# Patient Record
Sex: FEMALE | Race: WHITE | NOT HISPANIC OR LATINO | Employment: FULL TIME | URBAN - METROPOLITAN AREA
[De-identification: names, ages, dates, MRNs, and addresses within clinical notes are randomized per-mention and may not be internally consistent; named-entity substitution may affect disease eponyms.]

---

## 2017-09-15 ENCOUNTER — TRANSCRIBE ORDERS (OUTPATIENT)
Dept: URGENT CARE | Facility: CLINIC | Age: 62
End: 2017-09-15

## 2017-09-15 ENCOUNTER — APPOINTMENT (OUTPATIENT)
Dept: LAB | Facility: CLINIC | Age: 62
End: 2017-09-15
Payer: COMMERCIAL

## 2017-09-15 DIAGNOSIS — E55.9 VITAMIN D DEFICIENCY: ICD-10-CM

## 2017-09-15 DIAGNOSIS — R53.83 FATIGUE, UNSPECIFIED TYPE: Primary | ICD-10-CM

## 2017-09-15 LAB
25(OH)D3 SERPL-MCNC: 26.6 NG/ML (ref 30–100)
ALBUMIN SERPL BCP-MCNC: 3.6 G/DL (ref 3.5–5)
ALP SERPL-CCNC: 94 U/L (ref 46–116)
ALT SERPL W P-5'-P-CCNC: 25 U/L (ref 12–78)
ANION GAP SERPL CALCULATED.3IONS-SCNC: 6 MMOL/L (ref 4–13)
AST SERPL W P-5'-P-CCNC: 20 U/L (ref 5–45)
BILIRUB SERPL-MCNC: 0.54 MG/DL (ref 0.2–1)
BUN SERPL-MCNC: 11 MG/DL (ref 5–25)
CALCIUM SERPL-MCNC: 8.8 MG/DL (ref 8.3–10.1)
CHLORIDE SERPL-SCNC: 106 MMOL/L (ref 100–108)
CHOLEST SERPL-MCNC: 170 MG/DL (ref 50–200)
CO2 SERPL-SCNC: 28 MMOL/L (ref 21–32)
CREAT SERPL-MCNC: 0.74 MG/DL (ref 0.6–1.3)
GFR SERPL CREATININE-BSD FRML MDRD: 88 ML/MIN/1.73SQ M
GLUCOSE P FAST SERPL-MCNC: 96 MG/DL (ref 65–99)
HDLC SERPL-MCNC: 61 MG/DL (ref 40–60)
LDLC SERPL CALC-MCNC: 91 MG/DL (ref 0–100)
POTASSIUM SERPL-SCNC: 4 MMOL/L (ref 3.5–5.3)
PROT SERPL-MCNC: 6.9 G/DL (ref 6.4–8.2)
SODIUM SERPL-SCNC: 140 MMOL/L (ref 136–145)
TRIGL SERPL-MCNC: 89 MG/DL
TSH SERPL DL<=0.05 MIU/L-ACNC: 1.97 UIU/ML (ref 0.36–3.74)

## 2017-09-15 PROCEDURE — 80053 COMPREHEN METABOLIC PANEL: CPT

## 2017-09-15 PROCEDURE — 84443 ASSAY THYROID STIM HORMONE: CPT

## 2017-09-15 PROCEDURE — 82306 VITAMIN D 25 HYDROXY: CPT

## 2017-09-15 PROCEDURE — 80061 LIPID PANEL: CPT

## 2017-09-15 PROCEDURE — 86803 HEPATITIS C AB TEST: CPT

## 2017-09-15 PROCEDURE — 36415 COLL VENOUS BLD VENIPUNCTURE: CPT

## 2017-09-16 LAB — HCV AB SER QL: NORMAL

## 2018-01-10 ENCOUNTER — OFFICE VISIT (OUTPATIENT)
Dept: URGENT CARE | Facility: CLINIC | Age: 63
End: 2018-01-10
Payer: COMMERCIAL

## 2018-01-10 PROCEDURE — 99213 OFFICE O/P EST LOW 20 MIN: CPT

## 2018-01-16 NOTE — PROGRESS NOTES
Assessment   1  Bilateral otitis media (382 9) (H66 93)   2  Acute frontal sinusitis (461 1) (J01 10)    Plan   Acute frontal sinusitis    · Amoxicillin-Pot Clavulanate 875-125 MG Oral Tablet; TAKE 1 TABLET EVERY 12    HOURS DAILY    Discussion/Summary   Discussion Summary:    Discussed diagnosis of bilateral otitis media and sinusitis we will treat with Augmentin p  o  b i d  for 10 days and follow up with PCP in 3-5 days  Medication Side Effects Reviewed: Possible side effects of new medications were reviewed with the patient/guardian today  Understands and agrees with treatment plan: The treatment plan was reviewed with the patient/guardian  The patient/guardian understands and agrees with the treatment plan    Counseling Documentation With Imm: The patient was counseled regarding instructions for management,-- patient and family education,-- importance of compliance with treatment  total time of encounter was 25 minutes-- and-- 10 minutes was spent counseling  Follow Up Instructions: Follow Up with your Primary Care Provider in 3-5 days  If your symptoms worsen, go to the nearest [facility] Emergency Department  Chief Complaint   1  Dizziness  Chief Complaint Free Text Note Form: woke up this a m  with dizziness, room spinning and vomited ; off balance walking back to exam room  Did have cold sx prior to this dizziness  History of Present Illness   HPI: 79-year-old female at urgent care with chief complaint of dizziness felt like the room was spinning and vomited once earlier today no further complaints of nausea at this time also having complaints of ear fullness and pressure    Hospital Based Practices Required Assessment:      Pain Assessment      the patient states they do not have pain  Abuse And Domestic Violence Screen       Yes, the patient is safe at home  -- The patient states no one is hurting them  Depression And Suicide Screen   No, the patient has not had thoughts of hurting themself  Readiness To Learn: Receptive  Barriers To Learning: none  Preferred Learning: verbal      Education Completed: disease/condition,-- medications-- and-- further treatment/follow-up      Teaching Method: verbal      Person Taught: patient      Evaluation Of Learning: verbalized/demonstrated understanding    Dizziness: Hudson Kaur presents with complaints of dizziness  Associated symptoms include nausea,-- vomiting,-- symptoms of an upper respiratory infection,-- ear pain-- and-- ear fullness, but-- no weakness,-- no syncope,-- no difficulty ambulating,-- no diarrhea,-- no headache,-- no fever,-- no tinnitus,-- no hearing loss,-- no neck pain,-- no neck stiffness,-- no visual changes,-- no dysarthria,-- no dysphagia,-- no facial weakness-- and-- no falling episodes  Review of Systems   Focused-Female:      Constitutional: No fever, no chills, feels well, no tiredness, no recent weight gain or loss  ENT: earache-- and-- nasal discharge, but-- as noted in HPI  Cardiovascular: no complaints of slow or fast heart rate, no chest pain, no palpitations, no leg claudication or lower extremity edema  Respiratory: no complaints of shortness of breath, no wheezing, no dyspnea on exertion, no orthopnea or PND  Breasts: no complaints of breast pain, breast lump or nipple discharge  Gastrointestinal: no complaints of abdominal pain, no constipation, no nausea or diarrhea, no vomiting, no bloody stools  Genitourinary: no complaints of dysuria, no incontinence, no pelvic pain, no dysmenorrhea, no vaginal discharge or abnormal vaginal bleeding  Musculoskeletal: no complaints of arthralgia, no myalgia, no joint swelling or stiffness, no limb pain or swelling  Integumentary: no complaints of skin rash or lesion, no itching or dry skin, no skin wounds  Neurological: dizziness, but-- as noted in HPI  ROS Reviewed:    ROS reviewed        Active Problems   1  Abnormal heart sounds (785 3) (R01 2)   2  Abscess of finger, right (681 00) (L02 511)   3  Laryngitis (464 00) (J04 0)    Past Medical History   1  History of Allergic Contact Dermatitis (692 9)   2  History of Denial Of Any Significant Medical History  Active Problems And Past Medical History Reviewed: The active problems and past medical history were reviewed and updated today  Family History   Family History    1  Family history of Hypertension (V17 49)  Family History Reviewed: The family history was reviewed and updated today  Social History    · Denied: History of Drug use   · Never A Smoker   · Rarely consumes alcohol (V49 89) (Z78 9)  Social History Reviewed: The social history was reviewed and updated today  The social history was reviewed and is unchanged  Surgical History   1  History of Excision Of Lesion Neck Benign   2  History of Surgery Excision Of Parotid Tumor/Gland   3  History of Tonsillectomy With Adenoidectomy   4  History of Tubal Ligation  Surgical History Reviewed: The surgical history was reviewed and updated today  Current Meds    1  Amoxicillin 500 MG Oral Tablet; TAKE 1 TABLET 3 times daily; Therapy: 21Aug2015 to (Evaluate:28Aug2015)  Requested for: 21Aug2015; Last     Rx:21Aug2015 Ordered   2  Calcium 600 MG Oral Tablet; Therapy: (Recorded:25Apr2014) to Recorded   3  Dayquil LIQD;     Therapy: (Recorded:10Jan2018) to Recorded   4  NyQuil LIQD;     Therapy: (Recorded:10Jan2018) to Recorded   5  PredniSONE 20 MG Oral Tablet; TAKE 2 TABLETS IN THE MORNING FOR THE FIRST 3     DAYS, THEN TAKE 1 TABLET IN THE MORNING FOR THE NEXT 3 DAYS; Therapy: 21Aug2015 to (Last Rx:21Aug2015)  Requested for: 21Aug2015 Ordered   6  Vitamin C CAPS; Therapy: (Kervin Deutsch) to Recorded   7  Vitamin D3 CAPS; Therapy: (Recorded:25Apr2014) to Recorded  Medication List Reviewed: The medication list was reviewed and updated today  Allergies   1  No Known Drug Allergies    Vitals   Signs   Recorded: 37SYH8823 12:35PM   Temperature: 99 F  Heart Rate: 48  Respiration: 18  Systolic: 352  Diastolic: 80  Weight: 550 lb   BMI Calculated: 23 32  BSA Calculated: 1 68  O2 Saturation: 96  Pain Scale: 0    Physical Exam        Constitutional      General appearance: No acute distress, well appearing and well nourished  Eyes      Conjunctiva and lids: No swelling, erythema or discharge  Pupils and irises: Equal, round and reactive to light  Ears, Nose, Mouth, and Throat      External inspection of ears and nose: Normal        Otoscopic examination: Abnormal   The right tympanic membrane was red-- and-- was bulging  The left tympanic membrane was red-- and-- was bulging  The right external canal was normal  The left external canal was normal       Nasal mucosa, septum, and turbinates: Abnormal   normal nasal septum,-- no intranasal masses or polyps-- and-- normal nasal turbinates  There was a purulent discharge from both nares  The bilateral nasal mucosa was boggy  Oropharynx: Normal with no erythema, edema, exudate or lesions  Pulmonary      Respiratory effort: No increased work of breathing or signs of respiratory distress  Auscultation of lungs: Clear to auscultation  Cardiovascular      Palpation of heart: Normal PMI, no thrills  Auscultation of heart: Normal rate and rhythm, normal S1 and S2, without murmurs  Lymphatic      Palpation of lymph nodes in neck: No lymphadenopathy         Musculoskeletal      Gait and station: Normal        Psychiatric      Orientation to person, place, and time: Normal        Mood and affect: Normal        Signatures    Electronically signed by : Arti Ramos NP; Jorge 10 2018 12:54PM EST                       (Author)     Electronically signed by : MARY Banegas ; Jorge 15 2018 11:08AM EST                       (Co-author)

## 2018-01-23 VITALS
DIASTOLIC BLOOD PRESSURE: 80 MMHG | WEIGHT: 138 LBS | BODY MASS INDEX: 23.32 KG/M2 | OXYGEN SATURATION: 96 % | TEMPERATURE: 99 F | SYSTOLIC BLOOD PRESSURE: 142 MMHG | RESPIRATION RATE: 18 BRPM | HEART RATE: 48 BPM

## 2020-08-20 ENCOUNTER — TRANSCRIBE ORDERS (OUTPATIENT)
Dept: LAB | Facility: MEDICAL CENTER | Age: 65
End: 2020-08-20

## 2020-08-20 ENCOUNTER — APPOINTMENT (OUTPATIENT)
Dept: LAB | Facility: MEDICAL CENTER | Age: 65
End: 2020-08-20
Payer: COMMERCIAL

## 2020-08-20 DIAGNOSIS — I10 HYPERTENSION, UNSPECIFIED TYPE: Primary | ICD-10-CM

## 2020-08-20 DIAGNOSIS — M25.511 RIGHT SHOULDER PAIN, UNSPECIFIED CHRONICITY: ICD-10-CM

## 2020-08-20 DIAGNOSIS — R53.83 FATIGUE, UNSPECIFIED TYPE: ICD-10-CM

## 2020-08-20 DIAGNOSIS — I10 HYPERTENSION, UNSPECIFIED TYPE: ICD-10-CM

## 2020-08-20 DIAGNOSIS — M25.50 ARTHRALGIA, UNSPECIFIED JOINT: ICD-10-CM

## 2020-08-20 DIAGNOSIS — E55.9 VITAMIN D DEFICIENCY: ICD-10-CM

## 2020-08-20 DIAGNOSIS — F41.9 ANXIETY: ICD-10-CM

## 2020-08-20 DIAGNOSIS — R00.2 PALPITATIONS: ICD-10-CM

## 2020-08-20 LAB
25(OH)D3 SERPL-MCNC: 41.5 NG/ML (ref 30–100)
ALBUMIN SERPL BCP-MCNC: 4.1 G/DL (ref 3.5–5)
ALP SERPL-CCNC: 101 U/L (ref 46–116)
ALT SERPL W P-5'-P-CCNC: 22 U/L (ref 12–78)
ANION GAP SERPL CALCULATED.3IONS-SCNC: 4 MMOL/L (ref 4–13)
AST SERPL W P-5'-P-CCNC: 14 U/L (ref 5–45)
BASOPHILS # BLD AUTO: 0.05 THOUSANDS/ΜL (ref 0–0.1)
BASOPHILS NFR BLD AUTO: 1 % (ref 0–1)
BILIRUB SERPL-MCNC: 0.49 MG/DL (ref 0.2–1)
BUN SERPL-MCNC: 12 MG/DL (ref 5–25)
CALCIUM SERPL-MCNC: 9.1 MG/DL (ref 8.3–10.1)
CHLORIDE SERPL-SCNC: 107 MMOL/L (ref 100–108)
CO2 SERPL-SCNC: 31 MMOL/L (ref 21–32)
CREAT SERPL-MCNC: 0.65 MG/DL (ref 0.6–1.3)
EOSINOPHIL # BLD AUTO: 0.11 THOUSAND/ΜL (ref 0–0.61)
EOSINOPHIL NFR BLD AUTO: 2 % (ref 0–6)
ERYTHROCYTE [DISTWIDTH] IN BLOOD BY AUTOMATED COUNT: 13 % (ref 11.6–15.1)
GFR SERPL CREATININE-BSD FRML MDRD: 95 ML/MIN/1.73SQ M
GLUCOSE P FAST SERPL-MCNC: 83 MG/DL (ref 65–99)
HCT VFR BLD AUTO: 42.6 % (ref 34.8–46.1)
HGB BLD-MCNC: 13.8 G/DL (ref 11.5–15.4)
IMM GRANULOCYTES # BLD AUTO: 0.02 THOUSAND/UL (ref 0–0.2)
IMM GRANULOCYTES NFR BLD AUTO: 0 % (ref 0–2)
LYMPHOCYTES # BLD AUTO: 1.41 THOUSANDS/ΜL (ref 0.6–4.47)
LYMPHOCYTES NFR BLD AUTO: 29 % (ref 14–44)
MCH RBC QN AUTO: 29.9 PG (ref 26.8–34.3)
MCHC RBC AUTO-ENTMCNC: 32.4 G/DL (ref 31.4–37.4)
MCV RBC AUTO: 92 FL (ref 82–98)
MONOCYTES # BLD AUTO: 0.38 THOUSAND/ΜL (ref 0.17–1.22)
MONOCYTES NFR BLD AUTO: 8 % (ref 4–12)
NEUTROPHILS # BLD AUTO: 2.89 THOUSANDS/ΜL (ref 1.85–7.62)
NEUTS SEG NFR BLD AUTO: 60 % (ref 43–75)
NRBC BLD AUTO-RTO: 0 /100 WBCS
PLATELET # BLD AUTO: 236 THOUSANDS/UL (ref 149–390)
PMV BLD AUTO: 11.3 FL (ref 8.9–12.7)
POTASSIUM SERPL-SCNC: 3.7 MMOL/L (ref 3.5–5.3)
PROT SERPL-MCNC: 7.6 G/DL (ref 6.4–8.2)
RBC # BLD AUTO: 4.61 MILLION/UL (ref 3.81–5.12)
SODIUM SERPL-SCNC: 142 MMOL/L (ref 136–145)
T4 FREE SERPL-MCNC: 0.89 NG/DL (ref 0.76–1.46)
TSH SERPL DL<=0.05 MIU/L-ACNC: 1.42 UIU/ML (ref 0.36–3.74)
WBC # BLD AUTO: 4.86 THOUSAND/UL (ref 4.31–10.16)

## 2020-08-20 PROCEDURE — 86618 LYME DISEASE ANTIBODY: CPT

## 2020-08-20 PROCEDURE — 36415 COLL VENOUS BLD VENIPUNCTURE: CPT

## 2020-08-20 PROCEDURE — 84439 ASSAY OF FREE THYROXINE: CPT

## 2020-08-20 PROCEDURE — 82306 VITAMIN D 25 HYDROXY: CPT

## 2020-08-20 PROCEDURE — 80053 COMPREHEN METABOLIC PANEL: CPT

## 2020-08-20 PROCEDURE — 84443 ASSAY THYROID STIM HORMONE: CPT

## 2020-08-20 PROCEDURE — 85025 COMPLETE CBC W/AUTO DIFF WBC: CPT

## 2020-08-21 LAB — B BURGDOR IGG+IGM SER-ACNC: <0.91 ISR (ref 0–0.9)

## 2021-07-13 ENCOUNTER — EVALUATION (OUTPATIENT)
Dept: PHYSICAL THERAPY | Facility: CLINIC | Age: 66
End: 2021-07-13
Payer: COMMERCIAL

## 2021-07-13 VITALS — HEART RATE: 71 BPM | DIASTOLIC BLOOD PRESSURE: 87 MMHG | SYSTOLIC BLOOD PRESSURE: 128 MMHG

## 2021-07-13 DIAGNOSIS — M25.511 CHRONIC RIGHT SHOULDER PAIN: Primary | ICD-10-CM

## 2021-07-13 DIAGNOSIS — M75.01 ADHESIVE CAPSULITIS OF RIGHT SHOULDER: ICD-10-CM

## 2021-07-13 DIAGNOSIS — G89.29 CHRONIC RIGHT SHOULDER PAIN: Primary | ICD-10-CM

## 2021-07-13 PROCEDURE — 97140 MANUAL THERAPY 1/> REGIONS: CPT | Performed by: PHYSICAL THERAPIST

## 2021-07-13 PROCEDURE — 97110 THERAPEUTIC EXERCISES: CPT | Performed by: PHYSICAL THERAPIST

## 2021-07-13 PROCEDURE — 97161 PT EVAL LOW COMPLEX 20 MIN: CPT | Performed by: PHYSICAL THERAPIST

## 2021-07-13 NOTE — PROGRESS NOTES
PT Evaluation     Today's date: 2021  Patient name: Natalie Jacobo  : [birth year]  MRN: [id number]  Referring provider: Deng Nina MD  Dx:   Encounter Diagnosis     ICD-10-CM    1  Chronic right shoulder pain  M25 511     G89 29    2  Adhesive capsulitis of right shoulder  M75 01                   Assessment  Assessment details: Natalie Jacobo is a 61 y o  female who presents with complaints of  Chronic R shoulder pain  No further referral appears necessary at this time based upon examination results  Patient presents with severe limitations of R shoulder in all directions, primarily abduction > ER>IR>flexion  Clinical exam is consistent with adhesive capsulitis of R shoulder  Prognosis is good given HEP compliance and PT 2x/wk  Patient was provided today with a written HEP  Please contact me if you have any questions or recommendations  Thank you for the opportunity to share in  Janelle's care       Impairments: abnormal or restricted ROM, activity intolerance, impaired physical strength, lacks appropriate home exercise program, pain with function and poor posture   Functional limitations: severely limited functional ROM with dominant UEUnderstanding of Dx/Px/POC: good   Prognosis: good    Goals  STGs  1) In 4 weeks patient will report reduced pain to 4/10 "at worst"  2) In 4 weeks patient will demonstrate improved R shoulder ROM 15 deg or more in all directions  3) In 4 weeks patient will report improved sleep    LTGs  1) In 8-12 weeks patient will be independent with extensive HEP  2) In 8-12 weeks patient will demonstrate R shoulder ROM WNL all directions  3) In 8-12 weeks patient will report minimal R shoulder pain, <3/10 "at worst"    Plan  Patient would benefit from: skilled physical therapy  Referral necessary: No  Planned modality interventions: cryotherapy, TENS, thermotherapy: hydrocollator packs, unattended electrical stimulation and ultrasound  Planned therapy interventions: joint mobilization, manual therapy, massage, Velez taping, neuromuscular re-education, patient education, postural training, self care, strengthening, stretching, therapeutic activities, therapeutic exercise, flexibility, functional ROM exercises and home exercise program  Frequency: 2x week  Duration in weeks: 12  Plan of Care beginning date: 2021  Plan of Care expiration date: 10/5/2021  Treatment plan discussed with: patient        Subjective Evaluation    History of Present Illness  Mechanism of injury: Patient is a CNA at [employer]  She started noticing R shoulder pain around the start of COVID in the spring of 2020  She had then switched her role at work to working more on recreational activities but this did not help to relieve pain  Around Oct she noticed the pain cont to worsen  She then transitioned She saw her PCP in Oct and was prescribed Meloxicam and Voltaren  She did not notice much of an improvement  Since then the pain is about the same  She is back to her usual job since Oct which requires a lot of heavy lifting  She does experience pain that radiates down her arm to her elbow  A few times she might have noticed pain into her hand  Sometimes notices a tingling feeling in her elbow  The pain wakes her up every night  The pain is constant but fluctuates in intensity  Rest has not helped  She has tried heat and this helps her fall asleep  Has not tried using ice  OTC medication does not seem to help relieve the pain  She had an x-ray but no other imaging at this time  She has a f/u scheduled with her referring MD (PCP) at the end of July     Pain  At best pain rating: 3  At worst pain ratin  Quality: dull ache and burning  Relieving factors: change in position, support and heat  Aggravating factors: lifting    Social Support    Employment status: working  Hand dominance: right  Exercise history: walks      Diagnostic Tests  X-ray: normal  Treatments  Previous treatment: medication  Patient Goals  Patient goals for therapy: decreased pain and increased motion          Objective     Concurrent Complaints  Positive for disturbed sleep  Negative for dizziness, faints, headaches, nausea/motion sickness, tinnitus, trouble swallowing, difficulty breathing and shortness of breath    Additional Special Questions  Denies unexplained weight loss    Neurological Testing     Sensation   Cervical/Thoracic     Right   Intact: light touch    Reflexes     Right   Biceps (C5/C6): normal (2+)  Brachioradialis (C6): normal (2+)  Triceps (C7): normal (2+)    Active Range of Motion   Cervical/Thoracic Spine       Cervical    Flexion:  WFL  Extension:  WFL  Left lateral flexion:  with pain Restriction level: minimal  Right lateral flexion:  WFL  Left rotation:  with pain Restriction level: minimal  Right rotation:  WFL    Right Shoulder   Flexion: 80 degrees with pain  Extension: 25 degrees with pain  Abduction: 40 degrees with pain  External rotation BTH: C2 (tilts head to R) with pain  Internal rotation BTB: sacrum     Passive Range of Motion     Right Shoulder   Flexion: 50 degrees with pain  Abduction: 35 degrees with pain  External rotation 0°: 20 degrees with pain  Internal rotation 0°: 30 degrees with pain    Joint Play     Right Shoulder  Hypomobile in the anterior capsule, posterior capsule and inferior capsule     Neuro Exam:     Headaches   Patient reports headaches: No               Precautions: none  POC exp 10/5/21  Manuals 7/13            R shoulder stretching KG            R shoulder JMs KG  Post/inf capsule                                      Neuro Re-Ed                                                                                                        Ther Ex             Pulleys             Finger ladder             AAROM cane flexion supine 5" x10            AAROM cane ER supine 5" x10            AAROM cane abduction standing 5" x10            Doorway pec stretch Arms low            Thoracic extension over chair                                                                                                        Modalities             MHP to R shoulder prior to manuals NV                             Patient was provided a home exercise program and demonstrated an understanding of exercises  Patient was advised to stop performing home exercise program if symptoms increase or new complaints developed  Verbal understanding demonstrated regarding home exercise program instructions  Access Code: [email or web address]  URL: [email or web address]  Date: 07/13/2021  Prepared by: Maciej Thomas    Exercises  Supine Shoulder Flexion Overhead with Dowel - 3 x daily - 7 x weekly - 1 sets - 10 reps - 5 sec hold  Supine Shoulder External Rotation with Dowel - 3 x daily - 7 x weekly - 1 sets - 10 reps - 5 sec hold  Shoulder Flexion Overhead with Dowel - 3 x daily - 7 x weekly - 1 sets - 10 reps - 5 sec hold  Standing Shoulder External Rotation AAROM with Dowel - 3 x daily - 7 x weekly - 1 sets - 10 reps - 5 sec hold  Standing Shoulder Abduction AAROM with Dowel - 1 x daily - 7 x weekly - 3 sets - 10 reps

## 2021-07-13 NOTE — LETTER
2021    [place] [address]    Patient: Jany Burnett   YOB: 1955   Date of Visit: 2021     Encounter Diagnosis     ICD-10-CM    1  Chronic right shoulder pain  M25 511     G89 29    2  Adhesive capsulitis of right shoulder  M75 01        Dear Dr Fatemeh Vargas: Thank you for your recent referral of Jany Burnett  Please review the attached evaluation summary from Janelle's recent visit  Please verify that you agree with the plan of care by signing the attached order  If you have any questions or concerns, please do not hesitate to call  I sincerely appreciate the opportunity to share in the care of one of your patients and hope to have another opportunity to work with you in the near future  Sincerely,    Ayana Mahoney, PT      Referring Provider:      I certify that I have read the below Plan of Care and certify the need for these services furnished under this plan of treatment while under my care  Lore Douglas MD  [address] [address]  Via Fax: [phone]          PT Evaluation     Today's date: 2021  Patient name: Jany Burnett  : [birth year]  MRN: [id number]  Referring provider: Juju Sosa MD  Dx:   Encounter Diagnosis     ICD-10-CM    1  Chronic right shoulder pain  M25 511     G89 29    2  Adhesive capsulitis of right shoulder  M75 01                   Assessment  Assessment details: Jany Burnett is a 61 y o  female who presents with complaints of  Chronic R shoulder pain  No further referral appears necessary at this time based upon examination results  Patient presents with severe limitations of R shoulder in all directions, primarily abduction > ER>IR>flexion  Clinical exam is consistent with adhesive capsulitis of R shoulder  Prognosis is good given HEP compliance and PT 2x/wk  Patient was provided today with a written HEP   Please contact me if you have any questions or recommendations  Thank you for the opportunity to share in  Janelle's care  Impairments: abnormal or restricted ROM, activity intolerance, impaired physical strength, lacks appropriate home exercise program, pain with function and poor posture   Functional limitations: severely limited functional ROM with dominant UEUnderstanding of Dx/Px/POC: good   Prognosis: good    Goals  STGs  1) In 4 weeks patient will report reduced pain to 4/10 "at worst"  2) In 4 weeks patient will demonstrate improved R shoulder ROM 15 deg or more in all directions  3) In 4 weeks patient will report improved sleep    LTGs  1) In 8-12 weeks patient will be independent with extensive HEP  2) In 8-12 weeks patient will demonstrate R shoulder ROM WNL all directions  3) In 8-12 weeks patient will report minimal R shoulder pain, <3/10 "at worst"    Plan  Patient would benefit from: skilled physical therapy  Referral necessary: No  Planned modality interventions: cryotherapy, TENS, thermotherapy: hydrocollator packs, unattended electrical stimulation and ultrasound  Planned therapy interventions: joint mobilization, manual therapy, massage, Velez taping, neuromuscular re-education, patient education, postural training, self care, strengthening, stretching, therapeutic activities, therapeutic exercise, flexibility, functional ROM exercises and home exercise program  Frequency: 2x week  Duration in weeks: 12  Plan of Care beginning date: 7/13/2021  Plan of Care expiration date: 10/5/2021  Treatment plan discussed with: patient        Subjective Evaluation    History of Present Illness  Mechanism of injury: Patient is a CNA at [employer]  She started noticing R shoulder pain around the start of COVID in the spring of 2020  She had then switched her role at work to working more on recreational activities but this did not help to relieve pain  Around Oct she noticed the pain cont to worsen   She then transitioned She saw her PCP in Oct and was prescribed Meloxicam and Voltaren  She did not notice much of an improvement  Since then the pain is about the same  She is back to her usual job since Oct which requires a lot of heavy lifting  She does experience pain that radiates down her arm to her elbow  A few times she might have noticed pain into her hand  Sometimes notices a tingling feeling in her elbow  The pain wakes her up every night  The pain is constant but fluctuates in intensity  Rest has not helped  She has tried heat and this helps her fall asleep  Has not tried using ice  OTC medication does not seem to help relieve the pain  She had an x-ray but no other imaging at this time  She has a f/u scheduled with her referring MD (PCP) at the end of July  Pain  At best pain rating: 3  At worst pain ratin  Quality: dull ache and burning  Relieving factors: change in position, support and heat  Aggravating factors: lifting    Social Support    Employment status: working  Hand dominance: right  Exercise history: walks      Diagnostic Tests  X-ray: normal  Treatments  Previous treatment: medication  Patient Goals  Patient goals for therapy: decreased pain and increased motion          Objective     Concurrent Complaints  Positive for disturbed sleep   Negative for dizziness, faints, headaches, nausea/motion sickness, tinnitus, trouble swallowing, difficulty breathing and shortness of breath    Additional Special Questions  Denies unexplained weight loss    Neurological Testing     Sensation   Cervical/Thoracic     Right   Intact: light touch    Reflexes     Right   Biceps (C5/C6): normal (2+)  Brachioradialis (C6): normal (2+)  Triceps (C7): normal (2+)    Active Range of Motion   Cervical/Thoracic Spine       Cervical    Flexion:  WFL  Extension:  WFL  Left lateral flexion:  with pain Restriction level: minimal  Right lateral flexion:  WFL  Left rotation:  with pain Restriction level: minimal  Right rotation:  [facility]    Right Shoulder Flexion: 80 degrees with pain  Extension: 25 degrees with pain  Abduction: 40 degrees with pain  External rotation BTH: C2 (tilts head to R) with pain  Internal rotation BTB: sacrum     Passive Range of Motion     Right Shoulder   Flexion: 50 degrees with pain  Abduction: 35 degrees with pain  External rotation 0°: 20 degrees with pain  Internal rotation 0°: 30 degrees with pain    Joint Play     Right Shoulder  Hypomobile in the anterior capsule, posterior capsule and inferior capsule  Neuro Exam:     Headaches   Patient reports headaches: No               Precautions: none  POC exp 10/5/21  Manuals 7/13            R shoulder stretching KG            R shoulder JMs KG  Post/inf capsule                                      Neuro Re-Ed                                                                                                        Ther Ex             Pulleys             Finger ladder             AAROM cane flexion supine 5" x10            AAROM cane ER supine 5" x10            AAROM cane abduction standing 5" x10            Doorway pec stretch Arms low            Thoracic extension over chair                                                                                                        Modalities             MHP to R shoulder prior to manuals NV                             Patient was provided a home exercise program and demonstrated an understanding of exercises  Patient was advised to stop performing home exercise program if symptoms increase or new complaints developed  Verbal understanding demonstrated regarding home exercise program instructions  Access Code: [email or web address]  URL: [email or web address]  Date: 07/13/2021  Prepared by: Cade Vasquez    Exercises  Supine Shoulder Flexion Overhead with Dowel - 3 x daily - 7 x weekly - 1 sets - 10 reps - 5 sec hold  Supine Shoulder External Rotation with Dowel - 3 x daily - 7 x weekly - 1 sets - 10 reps - 5 sec hold  Shoulder Flexion Overhead with Dowel - 3 x daily - 7 x weekly - 1 sets - 10 reps - 5 sec hold  Standing Shoulder External Rotation AAROM with Dowel - 3 x daily - 7 x weekly - 1 sets - 10 reps - 5 sec hold  Standing Shoulder Abduction AAROM with Dowel - 1 x daily - 7 x weekly - 3 sets - 10 reps

## 2021-07-16 ENCOUNTER — OFFICE VISIT (OUTPATIENT)
Dept: PHYSICAL THERAPY | Facility: CLINIC | Age: 66
End: 2021-07-16
Payer: COMMERCIAL

## 2021-07-16 DIAGNOSIS — M25.511 CHRONIC RIGHT SHOULDER PAIN: Primary | ICD-10-CM

## 2021-07-16 DIAGNOSIS — M75.01 ADHESIVE CAPSULITIS OF RIGHT SHOULDER: ICD-10-CM

## 2021-07-16 DIAGNOSIS — G89.29 CHRONIC RIGHT SHOULDER PAIN: Primary | ICD-10-CM

## 2021-07-16 PROCEDURE — 97110 THERAPEUTIC EXERCISES: CPT | Performed by: PHYSICAL THERAPIST

## 2021-07-16 PROCEDURE — 97140 MANUAL THERAPY 1/> REGIONS: CPT | Performed by: PHYSICAL THERAPIST

## 2021-07-16 NOTE — PROGRESS NOTES
Daily Note     Today's date: 2021  Patient name: Jack Lopez  : [birth year]  MRN: [id number]  Referring provider: Humberto Jain MD  Dx:   Encounter Diagnosis     ICD-10-CM    1  Chronic right shoulder pain  M25 511     G89 29    2  Adhesive capsulitis of right shoulder  M75 01                   Subjective: Patient reports her shoulder feels about the same, was a little sore after her last session  She notes compliance with her HEP  Objective: See treatment diary below      Assessment: Initiated TE as outlined below in daily treatment diary  She is limited with ROM and strength for all planes in her shoulder  Most tightness is noted with ER followed by abduction  Pain level remains the same at end of session  Continued PT would be beneficial to improve function  Plan: Continue per plan of care  Progress as able in upcoming visits         Precautions: none  POC exp 10/5/21  Manuals       R shoulder stretching KG ML      R shoulder JMs KG  Post/inf capsule ML  Post/inf capsule                      Neuro Re-Ed                                                                Ther Ex        Pulleys  10" x 10 Flex/Scap       Finger ladder  5" x 5 Flex/Abd      AAROM cane flexion supine 5" x10 Supine 5" x 10      AAROM cane ER supine 5" x10 Supine 5" x 10      AAROM cane abduction standing 5" x10 Standing 5" x 10      Doorway pec stretch Arms low 15" x 4      Thoracic extension over chair                                                                Modalities        MHP to R shoulder prior to manuals NV 10 mins - pre tx

## 2021-07-20 ENCOUNTER — OFFICE VISIT (OUTPATIENT)
Dept: PHYSICAL THERAPY | Facility: CLINIC | Age: 66
End: 2021-07-20
Payer: COMMERCIAL

## 2021-07-20 DIAGNOSIS — M25.511 CHRONIC RIGHT SHOULDER PAIN: Primary | ICD-10-CM

## 2021-07-20 DIAGNOSIS — G89.29 CHRONIC RIGHT SHOULDER PAIN: Primary | ICD-10-CM

## 2021-07-20 DIAGNOSIS — M75.01 ADHESIVE CAPSULITIS OF RIGHT SHOULDER: ICD-10-CM

## 2021-07-20 PROCEDURE — 97110 THERAPEUTIC EXERCISES: CPT

## 2021-07-20 NOTE — PROGRESS NOTES
Daily Note     Today's date: 2021  Patient name: Maribel Freitas  :   MRN: [id number]  Referring provider: Shayy Gomez MD  Dx:   Encounter Diagnosis     ICD-10-CM    1  Chronic right shoulder pain  M25 511     G89 29    2  Adhesive capsulitis of right shoulder  M75 01        Start Time: 0915  Stop Time: 1010  Total time in clinic (min): 55 minutes    Subjective: Patient states, "I feel like the shoulder is starting to move more"  Objective: See treatment diary below    Assessment: Significant improvements with PROM since IE  ER and IR most limited at this time  Continue to focus ROM  Progress as able  Plan: Continue per plan of care        Precautions: none  POC exp 10/5/21  Manuals      R shoulder stretching KG ML JM     R shoulder JMs KG  Post/inf capsule ML  Post/inf capsule                      Neuro Re-Ed                                                                Ther Ex        Pulleys  10" x 10 Flex/Scap  10" x 10 Flex/Scap      Finger ladder  5" x 5 Flex/Abd 5" x 5 Flex/Abd     AAROM cane flexion supine 5" x10 Supine 5" x 10 Supine 5"x10     AAROM cane ER supine 5" x10 Supine 5" x 10 Supine 5" x 10     AAROM cane abduction standing 5" x10 Standing 5" x 10 Standing 5" x 10     Doorway pec stretch Arms low 15" x 4 15"x4     Thoracic extension over chair                                                                Modalities        MHP to R shoulder prior to manuals NV 10 mins - pre tx 10 mins - pre tx

## 2021-07-22 ENCOUNTER — OFFICE VISIT (OUTPATIENT)
Dept: PHYSICAL THERAPY | Facility: CLINIC | Age: 66
End: 2021-07-22
Payer: COMMERCIAL

## 2021-07-22 DIAGNOSIS — M75.01 ADHESIVE CAPSULITIS OF RIGHT SHOULDER: ICD-10-CM

## 2021-07-22 DIAGNOSIS — M25.511 CHRONIC RIGHT SHOULDER PAIN: Primary | ICD-10-CM

## 2021-07-22 DIAGNOSIS — G89.29 CHRONIC RIGHT SHOULDER PAIN: Primary | ICD-10-CM

## 2021-07-22 PROCEDURE — 97110 THERAPEUTIC EXERCISES: CPT | Performed by: PHYSICAL THERAPIST

## 2021-07-22 PROCEDURE — 97140 MANUAL THERAPY 1/> REGIONS: CPT | Performed by: PHYSICAL THERAPIST

## 2021-07-22 NOTE — PROGRESS NOTES
Daily Note     Today's date: 2021  Patient name: Travis Holloway  : [birth year]  MRN: [id number]  Referring provider: Jasbir See MD  Dx:   Encounter Diagnosis     ICD-10-CM    1  Chronic right shoulder pain  M25 511     G89 29    2  Adhesive capsulitis of right shoulder  M75 01                   Subjective: Patient reports she feels her arm is getting better  Objective: See treatment diary below      Assessment: Tolerated treatment well  Shoulder ROM improving steadily  Patient exhibited good technique with therapeutic exercises and would benefit from continued PT      Plan: Continue per plan of care  Progress treatment as tolerated         Precautions: none  POC exp 10/5/21  Manuals     R shoulder stretching KG ML JM KG    R shoulder JMs KG  Post/inf capsule ML  Post/inf capsule                      Neuro Re-Ed                                                                Ther Ex        Pulleys  10" x 10 Flex/Scap  10" x 10 Flex/Scap  10"x10 ea flex/scap    Finger ladder  5" x 5 Flex/Abd 5" x 5 Flex/Abd 5" x10 ea flex/abd    AAROM cane flexion supine 5" x10 Supine 5" x 10 Supine 5"x10 Supine  1# 5" x10    AAROM cane ER supine 5" x10 Supine 5" x 10 Supine 5" x 10 Standing 5" x10    AAROM cane abduction standing 5" x10 Standing 5" x 10 Standing 5" x 10 Standing 5" x10    Doorway pec stretch Arms low 15" x 4 15"x4 15"x4    Thoracic extension over chair    Seated 5" x10                                                            Modalities        MHP to R shoulder prior to manuals NV 10 mins - pre tx 10 mins - pre tx 10 mins pre-tx

## 2021-07-27 ENCOUNTER — OFFICE VISIT (OUTPATIENT)
Dept: PHYSICAL THERAPY | Facility: CLINIC | Age: 66
End: 2021-07-27
Payer: COMMERCIAL

## 2021-07-27 DIAGNOSIS — M25.511 CHRONIC RIGHT SHOULDER PAIN: Primary | ICD-10-CM

## 2021-07-27 DIAGNOSIS — M75.01 ADHESIVE CAPSULITIS OF RIGHT SHOULDER: ICD-10-CM

## 2021-07-27 DIAGNOSIS — G89.29 CHRONIC RIGHT SHOULDER PAIN: Primary | ICD-10-CM

## 2021-07-27 PROCEDURE — 97140 MANUAL THERAPY 1/> REGIONS: CPT

## 2021-07-27 PROCEDURE — 97110 THERAPEUTIC EXERCISES: CPT

## 2021-07-27 NOTE — PROGRESS NOTES
Daily Note     Today's date: 2021  Patient name: Vanessa Jackson  :   MRN: [id number]  Referring provider: Rachna Cartwright MD  Dx:   Encounter Diagnosis     ICD-10-CM    1  Chronic right shoulder pain  M25 511     G89 29    2  Adhesive capsulitis of right shoulder  M75 01                   Subjective: "I am starting to feel a little better  I am able to lift my arm higher with less pain "  Pt does continue to have a "snagging" feeling into her bicep when lifting her arm past 90* flex  Objective: See treatment diary below      Assessment: Tolerated treatment well  Pain free ROM continues to increase  Patient demonstrated fatigue post treatment, exhibited good technique with therapeutic exercises and would benefit from continued PT  No adverse affect to MHP noted  Plan: Continue per plan of care  Progress treatment as tolerated         Precautions: none  POC exp 10/5/21  Manuals    R shoulder stretching KG ML JM KG TM   R shoulder JMs KG  Post/inf capsule ML  Post/inf capsule                      Neuro Re-Ed                                                                Ther Ex        Pulleys  10" x 10 Flex/Scap  10" x 10 Flex/Scap  10"x10 ea flex/scap 10"x10 ea flex/scap   Finger ladder  5" x 5 Flex/Abd 5" x 5 Flex/Abd 5" x10 ea flex/abd 5" x10 ea flex/abd   AAROM cane flexion supine 5" x10 Supine 5" x 10 Supine 5"x10 Supine  1# 5" x10 Supine  1# 5" x10   AAROM cane ER supine 5" x10 Supine 5" x 10 Supine 5" x 10 Standing 5" x10 Standing 5" x10   AAROM cane abduction standing 5" x10 Standing 5" x 10 Standing 5" x 10 Standing 5" x10 Standing 5" x10   Doorway pec stretch Arms low 15" x 4 15"x4 15"x4 15"x4   Thoracic extension over chair    Seated 5" x10 Seated 5" x10                                                           Modalities        MHP to R shoulder prior to manuals NV 10 mins - pre tx 10 mins - pre tx 10 mins pre-tx 10 mins pre-tx

## 2021-07-30 ENCOUNTER — OFFICE VISIT (OUTPATIENT)
Dept: PHYSICAL THERAPY | Facility: CLINIC | Age: 66
End: 2021-07-30
Payer: COMMERCIAL

## 2021-07-30 DIAGNOSIS — M25.511 CHRONIC RIGHT SHOULDER PAIN: Primary | ICD-10-CM

## 2021-07-30 DIAGNOSIS — M75.01 ADHESIVE CAPSULITIS OF RIGHT SHOULDER: ICD-10-CM

## 2021-07-30 DIAGNOSIS — G89.29 CHRONIC RIGHT SHOULDER PAIN: Primary | ICD-10-CM

## 2021-07-30 PROCEDURE — 97140 MANUAL THERAPY 1/> REGIONS: CPT

## 2021-07-30 PROCEDURE — 97110 THERAPEUTIC EXERCISES: CPT

## 2021-07-30 NOTE — PROGRESS NOTES
Daily Note     Today's date: 2021  Patient name: Jack Lopez  :   MRN: [id number]  Referring provider: Humberto Jain MD  Dx:   Encounter Diagnosis     ICD-10-CM    1  Chronic right shoulder pain  M25 511     G89 29    2  Adhesive capsulitis of right shoulder  M75 01                   Subjective: Pt reported that her shoulder is feeling better  She is does have increased/pain free ROM  Objective: See treatment diary below       Assessment: Tolerated treatment well  Patient demonstrated fatigue post treatment, exhibited good technique with therapeutic exercises and would benefit from continued PT  No adverse affect to MHP prior to Tx  Plan: Continue per plan of care  Progress treatment as tolerated         Precautions: none  POC exp 10/5/21  Manuals    R shoulder stretching TM ML JM KG TM   R shoulder JMs  ML  Post/inf capsule                      Neuro Re-Ed                                                                Ther Ex        Pulleys 10"x10 ea flex/scap 10" x 10 Flex/Scap  10" x 10 Flex/Scap  10"x10 ea flex/scap 10"x10 ea flex/scap   Finger ladder 5" x10 ea flex/abd 5" x 5 Flex/Abd 5" x 5 Flex/Abd 5" x10 ea flex/abd 5" x10 ea flex/abd   AAROM cane flexion Supine 1# 5" x10 Supine 5" x 10 Supine 5"x10 Supine  1# 5" x10 Supine  1# 5" x10   AAROM cane ER Standing 5" x10 Supine 5" x 10 Supine 5" x 10 Standing 5" x10 Standing 5" x10   AAROM cane abduction standing 5" x10 Standing 5" x 10 Standing 5" x 10 Standing 5" x10 Standing 5" x10   Doorway pec stretch 15"x4 15" x 4 15"x4 15"x4 15"x4   Thoracic extension over chair Seated 5" x10   Seated 5" x10 Seated 5" x10    rpm x5' fwd     Add 5' retro NV                Consider adding TB exercises NV                                       Modalities        MHP to R shoulder prior to manuals 10 mins pre-tx    DC NV 10 mins - pre tx 10 mins - pre tx 10 mins pre-tx 10 mins pre-tx

## 2021-08-03 ENCOUNTER — OFFICE VISIT (OUTPATIENT)
Dept: PHYSICAL THERAPY | Facility: CLINIC | Age: 66
End: 2021-08-03
Payer: COMMERCIAL

## 2021-08-03 DIAGNOSIS — M25.511 CHRONIC RIGHT SHOULDER PAIN: Primary | ICD-10-CM

## 2021-08-03 DIAGNOSIS — G89.29 CHRONIC RIGHT SHOULDER PAIN: Primary | ICD-10-CM

## 2021-08-03 DIAGNOSIS — M75.01 ADHESIVE CAPSULITIS OF RIGHT SHOULDER: ICD-10-CM

## 2021-08-03 PROCEDURE — 97140 MANUAL THERAPY 1/> REGIONS: CPT

## 2021-08-03 PROCEDURE — 97112 NEUROMUSCULAR REEDUCATION: CPT

## 2021-08-03 PROCEDURE — 97110 THERAPEUTIC EXERCISES: CPT

## 2021-08-03 NOTE — PROGRESS NOTES
Daily Note     Today's date: 8/3/2021  Patient name: Julia Barker  :   MRN: [id number]  Referring provider: Deandre Barnes MD  Dx:   Encounter Diagnosis     ICD-10-CM    1  Chronic right shoulder pain  M25 511     G89 29    2  Adhesive capsulitis of right shoulder  M75 01                   Subjective: Patient reports she is able to use her arm more  Objective: See treatment diary below      Assessment: Tolerated treatment well  Patient would benefit from continued PT      Plan: Continue per plan of care        Precautions: none  POC exp 10/5/21  Manuals 7/30 8/3  7/22 7/27   R shoulder stretching TM HOLLOWAY  KG TM   R shoulder JMs                        Neuro Re-Ed                                                                Ther Ex        Pulleys 10"x10 ea flex/scap 10"x10 flex and scap  10"x10 ea flex/scap 10"x10 ea flex/scap   Finger ladder 5" x10 ea flex/abd 5"x10 flex/abd  5" x10 ea flex/abd 5" x10 ea flex/abd   AAROM cane flexion Supine 1# 5" x10 Supine 1# 5" x10  Supine  1# 5" x10 Supine  1# 5" x10   AAROM cane ER Standing 5" x10 Standing 5" x10  Standing 5" x10 Standing 5" x10   AAROM cane abduction standing 5" x10 Standing 5" x10  Standing 5" x10 Standing 5" x10   Doorway pec stretch 15"x4   15"x4 15"x4   Thoracic extension over chair Seated 5" x10   Seated 5" x10 Seated 5" x10    rpm x5' fwd     Add 5' retro  rpm x5' retro               Consider adding TB exercises NV                                  +     Modalities        MHP to R shoulder prior to manuals 10 mins pre-tx    DC NV 10 min pre-tx  10 mins pre-tx 10 mins pre-tx

## 2021-08-05 ENCOUNTER — OFFICE VISIT (OUTPATIENT)
Dept: PHYSICAL THERAPY | Facility: CLINIC | Age: 66
End: 2021-08-05
Payer: COMMERCIAL

## 2021-08-05 DIAGNOSIS — M25.511 CHRONIC RIGHT SHOULDER PAIN: Primary | ICD-10-CM

## 2021-08-05 DIAGNOSIS — M75.01 ADHESIVE CAPSULITIS OF RIGHT SHOULDER: ICD-10-CM

## 2021-08-05 DIAGNOSIS — G89.29 CHRONIC RIGHT SHOULDER PAIN: Primary | ICD-10-CM

## 2021-08-05 PROCEDURE — 97140 MANUAL THERAPY 1/> REGIONS: CPT | Performed by: PHYSICAL THERAPIST

## 2021-08-05 PROCEDURE — 97110 THERAPEUTIC EXERCISES: CPT | Performed by: PHYSICAL THERAPIST

## 2021-08-05 NOTE — PROGRESS NOTES
Daily Note     Today's date: 2021  Patient name: Molly Simon  :   MRN: [id number]  Referring provider: Charolette Duverney, MD  Dx:   Encounter Diagnosis     ICD-10-CM    1  Chronic right shoulder pain  M25 511     G89 29    2  Adhesive capsulitis of right shoulder  M75 01                   Subjective: Patient reports her shoulder continues to feel better  Objective: See treatment diary below      Assessment: Tolerated treatment well  Added sleeper stretch and towel IR stretch to encourage restoring R shoulder functional IR  Patient demonstrated fatigue post treatment, exhibited good technique with therapeutic exercises and would benefit from continued PT      Plan: Continue per plan of care  Progress treatment as tolerated         Precautions: none  POC exp 10/5/21  Manuals 7/30 8/3 8/5 7/22 7/27   R shoulder stretching TM HOLLOWAY KG KG TM   R scapular mobs   KG                     Neuro Re-Ed                                                                Ther Ex        Pulleys 10"x10 ea flex/scap 10"x10 flex and scap 10"x10 flex and scap 10"x10 ea flex/scap 10"x10 ea flex/scap   Finger ladder 5" x10 ea flex/abd 5"x10 flex/abd 5" x10 flex/abd 5" x10 ea flex/abd 5" x10 ea flex/abd   AAROM cane flexion Supine 1# 5" x10 Supine 1# 5" x10 Supine 2# 5" x10 Supine  1# 5" x10 Supine  1# 5" x10   AAROM cane ER Standing 5" x10 Standing 5" x10 Standing with arm @90 deg abd against wall  5" x10 Standing 5" x10 Standing 5" x10   AAROM cane abduction standing 5" x10 Standing 5" x10 DC Standing 5" x10 Standing 5" x10   Doorway pec stretch 15"x4   15"x4 15"x4   Thoracic extension over chair Seated 5" x10  5" x10 Seated 5" x10 Seated 5" x10    rpm x5' fwd     Add 5' retro  rpm x5' retro 120 rpm 5'fwd/5'retro     Supine scap punches   2# 3" x20      Consider adding TB exercises NV  TB IR/ER NV     Sidelying sleeper stretch   10"x10     Towel IR stretch   10"x10                     Modalities        MHP to R shoulder prior to manuals 10 mins pre-tx    DC NV 10 min pre-tx  10 mins pre-tx 10 mins pre-tx

## 2021-08-10 ENCOUNTER — EVALUATION (OUTPATIENT)
Dept: PHYSICAL THERAPY | Facility: CLINIC | Age: 66
End: 2021-08-10
Payer: COMMERCIAL

## 2021-08-10 DIAGNOSIS — G89.29 CHRONIC RIGHT SHOULDER PAIN: Primary | ICD-10-CM

## 2021-08-10 DIAGNOSIS — M75.01 ADHESIVE CAPSULITIS OF RIGHT SHOULDER: ICD-10-CM

## 2021-08-10 DIAGNOSIS — M25.511 CHRONIC RIGHT SHOULDER PAIN: Primary | ICD-10-CM

## 2021-08-10 PROCEDURE — 97140 MANUAL THERAPY 1/> REGIONS: CPT | Performed by: PHYSICAL THERAPIST

## 2021-08-10 PROCEDURE — 97110 THERAPEUTIC EXERCISES: CPT

## 2021-08-10 NOTE — LETTER
2021    [facility] [id number]    Patient: Torres Loredo   YOB: 1955   Date of Visit: 8/10/2021     Encounter Diagnosis     ICD-10-CM    1  Chronic right shoulder pain  M25 511     G89 29    2  Adhesive capsulitis of right shoulder  M75 01        Dear Dr Landy Bales: Thank you for your recent referral of Torres Loredo  Please review the attached evaluation summary from Janelle's recent visit  Please verify that you agree with the plan of care by signing the attached order  If you have any questions or concerns, please do not hesitate to call  I sincerely appreciate the opportunity to share in the care of one of your patients and hope to have another opportunity to work with you in the near future  Sincerely,    Maureen Wang, PT      Referring Provider:      I certify that I have read the below Plan of Care and certify the need for these services furnished under this plan of treatment while under my care  Adarsh Vargas MD  [address]  Via Fax: [phone]          PT Progress Note     Today's date: 8/10/2021  Patient name: Torres Loredo  : [birth year]  MRN: [id number]  Referring provider: Tejas Espinoza MD  Dx:   Encounter Diagnosis     ICD-10-CM    1  Chronic right shoulder pain  M25 511     G89 29    2  Adhesive capsulitis of right shoulder  M75 01                   Assessment  Assessment details: Torres Loredo has been compliant with PT services  She is making steady progress toward goals  She has demonstrated decreased pain,  increased range of motion, and increased activity tolerance since starting physical therapy services  She reports an overall improvement of 75% thus far    She continues to present with pain, decreased strength, decreased range of motion, and decreased activity tolerance and would benefit from additional skilled physical therapy interventions to address impairments and maximize function  Impairments: abnormal or restricted ROM, activity intolerance, impaired physical strength, lacks appropriate home exercise program, pain with function and poor posture   Functional limitations: severely limited functional ROM with dominant UEUnderstanding of Dx/Px/POC: good   Prognosis: good    Goals  STGs  1) In 4 weeks patient will report reduced pain to 4/10 "at worst"- MET  2) In 4 weeks patient will demonstrate improved R shoulder ROM 15 deg or more in all directions-MET  3) In 4 weeks patient will report improved sleep-MET    LTGs  1) In 8-12 weeks patient will be independent with extensive HEP  2) In 8-12 weeks patient will demonstrate R shoulder ROM WNL all directions  3) In 8-12 weeks patient will report minimal R shoulder pain, <3/10 "at worst"    Plan  Patient would benefit from: skilled physical therapy  Referral necessary: No  Planned modality interventions: cryotherapy, TENS, thermotherapy: hydrocollator packs, unattended electrical stimulation and ultrasound  Planned therapy interventions: joint mobilization, manual therapy, massage, Velez taping, neuromuscular re-education, patient education, postural training, self care, strengthening, stretching, therapeutic activities, therapeutic exercise, flexibility, functional ROM exercises and home exercise program  Frequency: 2x week  Duration in weeks: 8  Plan of Care beginning date: 8/10/2021  Plan of Care expiration date: 10/5/2021  Treatment plan discussed with: patient        Subjective Evaluation    History of Present Illness  Mechanism of injury: Patient is a CNA at [employer]  She started noticing R shoulder pain around the start of COVID in the spring of 2020  She had then switched her role at work to working more on recreational activities but this did not help to relieve pain  Around Oct she noticed the pain cont to worsen   She then transitioned She saw her PCP in Oct and was prescribed Meloxicam and Voltaren  She did not notice much of an improvement  Since then the pain is about the same  She is back to her usual job since Oct which requires a lot of heavy lifting  She does experience pain that radiates down her arm to her elbow  A few times she might have noticed pain into her hand  Sometimes notices a tingling feeling in her elbow  The pain wakes her up every night  The pain is constant but fluctuates in intensity  Rest has not helped  She has tried heat and this helps her fall asleep  Has not tried using ice  OTC medication does not seem to help relieve the pain  She had an x-ray but no other imaging at this time  She has a f/u scheduled with her referring MD (PCP) at the end of July  Progress Note 8/10/21:  Patient is pleased with her progress  She is feeling 75% improved since starting PT  She reports she has avoided any heavy lifting but can now reach behind her head to wash her hair and under her arm to put on deodorant  She still has difficulty reaching behind her lower back but it is improving  She can now reach the second shelf in her cupboards  Pain levels have continued to decrease  Pain  At best pain ratin  At worst pain rating: 3  Quality: dull ache and burning  Relieving factors: change in position, support and heat  Aggravating factors: lifting    Social Support    Employment status: working  Hand dominance: right  Exercise history: walks      Diagnostic Tests  X-ray: normal  Treatments  Previous treatment: medication  Current treatment: physical therapy  Patient Goals  Patient goals for therapy: decreased pain and increased motion          Objective     Concurrent Complaints  Positive for disturbed sleep   Negative for dizziness, faints, headaches, nausea/motion sickness, tinnitus, trouble swallowing, difficulty breathing and shortness of breath    Additional Special Questions  Denies unexplained weight loss    Neurological Testing     Sensation   Cervical/Thoracic     Right   Intact: light touch    Reflexes     Right   Biceps (C5/C6): normal (2+)  Brachioradialis (C6): normal (2+)  Triceps (C7): normal (2+)    Active Range of Motion   Cervical/Thoracic Spine       Cervical    Flexion:  WFL  Extension:  WFL  Left lateral flexion:  with pain Restriction level: minimal  Right lateral flexion:  WFL  Left rotation:  with pain Restriction level: minimal  Right rotation:  WFL    Right Shoulder   Flexion: 140 degrees   Extension: 45 degrees with pain  Abduction: 110 degrees with pain  External rotation BTH: T4   Internal rotation BTB: L5     Passive Range of Motion     Right Shoulder   Flexion: 138 degrees with pain  Abduction: 130 degrees with pain  External rotation 90°: 70 degrees   Internal rotation 90°: 25 degrees with pain    Joint Play     Right Shoulder  Hypomobile in the anterior capsule, posterior capsule and inferior capsule     Neuro Exam:     Headaches   Patient reports headaches: No             Precautions: none  POC exp 10/5/21  Manuals 7/30 8/3 8/5 8/10 7/27   R shoulder stretching TM HOLLOWAY KG KG TM   R scapular mobs   KG KG                    Neuro Re-Ed                                                                Ther Ex        Pulleys 10"x10 ea flex/scap 10"x10 flex and scap 10"x10 flex and scap 10"x10 ea flex/scap 10"x10 ea flex/scap   Finger ladder 5" x10 ea flex/abd 5"x10 flex/abd 5" x10 flex/abd 5" x10 ea flex/abd 5" x10 ea flex/abd   AAROM cane flexion Supine 1# 5" x10 Supine 1# 5" x10 Supine 2# 5" x10 Supine  2# 5" x10 Supine  1# 5" x10   AAROM cane ER Standing 5" x10 Standing 5" x10 Standing with arm @90 deg abd against wall  5" x10 Standing with arm @90 deg abd against wall  5" x10 Standing 5" x10   AAROM cane abduction standing 5" x10 Standing 5" x10 DC  Standing 5" x10   Doorway pec stretch 15"x4    15"x4   Thoracic extension over chair Seated 5" x10  5" x10  Seated 5" x10    rpm x5' fwd     Add 5' retro  rpm x5' retro 120 rpm 5'fwd/5'retro 120 rpm 5'fwd/5'retro Supine scap punches   2# 3" x20 2# 3" x20     Consider adding TB exercises NV  TB IR/ER NV Red TB x20 ea    Sidelying sleeper stretch   10"x10 10"x10    Towel IR stretch   10"x10 10"x10                    Modalities        MHP to R shoulder prior to manuals 10 mins pre-tx    DC NV 10 min pre-tx  DC 10 mins pre-tx

## 2021-08-10 NOTE — PROGRESS NOTES
PT Progress Note     Today's date: 8/10/2021  Patient name: Ailyn Martell  :   MRN: [id number]  Referring provider: Rebekah Cranker, MD  Dx:   Encounter Diagnosis     ICD-10-CM    1  Chronic right shoulder pain  M25 511     G89 29    2  Adhesive capsulitis of right shoulder  M75 01                   Assessment  Assessment details: Ailyn Martell has been compliant with PT services  She is making steady progress toward goals  She has demonstrated decreased pain,  increased range of motion, and increased activity tolerance since starting physical therapy services  She reports an overall improvement of 75% thus far  She continues to present with pain, decreased strength, decreased range of motion, and decreased activity tolerance and would benefit from additional skilled physical therapy interventions to address impairments and maximize function      Impairments: abnormal or restricted ROM, activity intolerance, impaired physical strength, lacks appropriate home exercise program, pain with function and poor posture   Functional limitations: severely limited functional ROM with dominant UEUnderstanding of Dx/Px/POC: good   Prognosis: good    Goals  STGs  1) In 4 weeks patient will report reduced pain to 4/10 "at worst"- MET  2) In 4 weeks patient will demonstrate improved R shoulder ROM 15 deg or more in all directions-MET  3) In 4 weeks patient will report improved sleep-MET    LTGs  1) In 8-12 weeks patient will be independent with extensive HEP  2) In 8-12 weeks patient will demonstrate R shoulder ROM WNL all directions  3) In 8-12 weeks patient will report minimal R shoulder pain, <3/10 "at worst"    Plan  Patient would benefit from: skilled physical therapy  Referral necessary: No  Planned modality interventions: cryotherapy, TENS, thermotherapy: hydrocollator packs, unattended electrical stimulation and ultrasound  Planned therapy interventions: joint mobilization, manual therapy, massage, Rafael Hernandez Paget taping, neuromuscular re-education, patient education, postural training, self care, strengthening, stretching, therapeutic activities, therapeutic exercise, flexibility, functional ROM exercises and home exercise program  Frequency: 2x week  Duration in weeks: 8  Plan of Care beginning date: 8/10/2021  Plan of Care expiration date: 10/5/2021  Treatment plan discussed with: patient        Subjective Evaluation    History of Present Illness  Mechanism of injury: Patient is a CNA at [email or web address]  She started noticing R shoulder pain around the start of COVID in the spring of 2020  She had then switched her role at work to working more on recreational activities but this did not help to relieve pain  Around Oct she noticed the pain cont to worsen  She then transitioned She saw her PCP in Oct and was prescribed Meloxicam and Voltaren  She did not notice much of an improvement  Since then the pain is about the same  She is back to her usual job since Oct which requires a lot of heavy lifting  She does experience pain that radiates down her arm to her elbow  A few times she might have noticed pain into her hand  Sometimes notices a tingling feeling in her elbow  The pain wakes her up every night  The pain is constant but fluctuates in intensity  Rest has not helped  She has tried heat and this helps her fall asleep  Has not tried using ice  OTC medication does not seem to help relieve the pain  She had an x-ray but no other imaging at this time  She has a f/u scheduled with her referring MD (PCP) at the end of July  Progress Note 8/10/21:  Patient is pleased with her progress  She is feeling 75% improved since starting PT  She reports she has avoided any heavy lifting but can now reach behind her head to wash her hair and under her arm to put on deodorant  She still has difficulty reaching behind her lower back but it is improving  She can now reach the second shelf in her cupboards   Pain levels have continued to decrease  Pain  At best pain ratin  At worst pain rating: 3  Quality: dull ache and burning  Relieving factors: change in position, support and heat  Aggravating factors: lifting    Social Support    Employment status: working  Hand dominance: right  Exercise history: walks      Diagnostic Tests  X-ray: normal  Treatments  Previous treatment: medication  Current treatment: physical therapy  Patient Goals  Patient goals for therapy: decreased pain and increased motion          Objective     Concurrent Complaints  Positive for disturbed sleep  Negative for dizziness, faints, headaches, nausea/motion sickness, tinnitus, trouble swallowing, difficulty breathing and shortness of breath    Additional Special Questions  Denies unexplained weight loss    Neurological Testing     Sensation   Cervical/Thoracic     Right   Intact: light touch    Reflexes     Right   Biceps (C5/C6): normal (2+)  Brachioradialis (C6): normal (2+)  Triceps (C7): normal (2+)    Active Range of Motion   Cervical/Thoracic Spine       Cervical    Flexion:  WFL  Extension:  WFL  Left lateral flexion:  with pain Restriction level: minimal  Right lateral flexion:  WFL  Left rotation:  with pain Restriction level: minimal  Right rotation:  WFL    Right Shoulder   Flexion: 140 degrees   Extension: 45 degrees with pain  Abduction: 110 degrees with pain  External rotation BTH: T4   Internal rotation BTB: L5     Passive Range of Motion     Right Shoulder   Flexion: 138 degrees with pain  Abduction: 130 degrees with pain  External rotation 90°: 70 degrees   Internal rotation 90°: 25 degrees with pain    Joint Play     Right Shoulder  Hypomobile in the anterior capsule, posterior capsule and inferior capsule     Neuro Exam:     Headaches   Patient reports headaches: No             Precautions: none  POC exp 10/5/21  Manuals 7/30 8/3 8/5 8/10 7/27   R shoulder stretching TM HOLLOWAY KG KG TM   R scapular mobs   KG KG                    Neuro Re-Ed Ther Ex        Pulleys 10"x10 ea flex/scap 10"x10 flex and scap 10"x10 flex and scap 10"x10 ea flex/scap 10"x10 ea flex/scap   Finger ladder 5" x10 ea flex/abd 5"x10 flex/abd 5" x10 flex/abd 5" x10 ea flex/abd 5" x10 ea flex/abd   AAROM cane flexion Supine 1# 5" x10 Supine 1# 5" x10 Supine 2# 5" x10 Supine  2# 5" x10 Supine  1# 5" x10   AAROM cane ER Standing 5" x10 Standing 5" x10 Standing with arm @90 deg abd against wall  5" x10 Standing with arm @90 deg abd against wall  5" x10 Standing 5" x10   AAROM cane abduction standing 5" x10 Standing 5" x10 DC  Standing 5" x10   Doorway pec stretch 15"x4    15"x4   Thoracic extension over chair Seated 5" x10  5" x10  Seated 5" x10    rpm x5' fwd     Add 5' retro  rpm x5' retro 120 rpm 5'fwd/5'retro 120 rpm 5'fwd/5'retro    Supine scap punches   2# 3" x20 2# 3" x20     Consider adding TB exercises NV  TB IR/ER NV Red TB x20 ea    Sidelying sleeper stretch   10"x10 10"x10    Towel IR stretch   10"x10 10"x10                    Modalities        MHP to R shoulder prior to manuals 10 mins pre-tx    DC NV 10 min pre-tx  DC 10 mins pre-tx

## 2021-08-13 ENCOUNTER — OFFICE VISIT (OUTPATIENT)
Dept: PHYSICAL THERAPY | Facility: CLINIC | Age: 66
End: 2021-08-13
Payer: COMMERCIAL

## 2021-08-13 DIAGNOSIS — M75.01 ADHESIVE CAPSULITIS OF RIGHT SHOULDER: ICD-10-CM

## 2021-08-13 DIAGNOSIS — G89.29 CHRONIC RIGHT SHOULDER PAIN: Primary | ICD-10-CM

## 2021-08-13 DIAGNOSIS — M25.511 CHRONIC RIGHT SHOULDER PAIN: Primary | ICD-10-CM

## 2021-08-13 PROCEDURE — 97140 MANUAL THERAPY 1/> REGIONS: CPT

## 2021-08-13 PROCEDURE — 97110 THERAPEUTIC EXERCISES: CPT

## 2021-08-13 NOTE — PROGRESS NOTES
Daily Note     Today's date: 2021  Patient name: Milan Phillips  : [birth year]  MRN: [id number]  Referring provider: Jasvir George MD  Dx:   Encounter Diagnosis     ICD-10-CM    1  Chronic right shoulder pain  M25 511     G89 29    2  Adhesive capsulitis of right shoulder  M75 01                   Subjective: Pt is pleased with her progress this far  She is able to reach the top shelving in her kitchen  Her chief complaint is the pain she gets during ER of the R shoulder  Objective: See treatment diary below      Assessment: Tolerated treatment well  Patient demonstrated fatigue post treatment, exhibited good technique with therapeutic exercises and would benefit from continued PT      Plan: Continue per plan of care  Progress treatment as tolerated         Precautions: none  POC exp 10/5/21  Manuals 7/30 8/3 8/5 8/10 8/13   R shoulder stretching TM HOLLOWAY KG KG TM   R scapular mobs   KG KG                    Neuro Re-Ed                                                                Ther Ex        Pulleys 10"x10 ea flex/scap 10"x10 flex and scap 10"x10 flex and scap 10"x10 ea flex/scap 10"x10 ea flex/scap   Finger ladder 5" x10 ea flex/abd 5"x10 flex/abd 5" x10 flex/abd 5" x10 ea flex/abd 5" x10 ea flex/abd   AAROM cane flexion Supine 1# 5" x10 Supine 1# 5" x10 Supine 2# 5" x10 Supine  2# 5" x10 Supine  3# 5" x10   AAROM cane ER Standing 5" x10 Standing 5" x10 Standing with arm @90 deg abd against wall  5" x10 Standing with arm @90 deg abd against wall   5" x10 Standing with arm @90 deg abd against wall   5" 2x10   AAROM cane abduction standing 5" x10 Standing 5" x10 DC     Doorway pec stretch 15"x4       Thoracic extension over chair Seated 5" x10  5" x10      rpm x5' fwd     Add 5' retro  rpm x5' retro 120 rpm 5'fwd/5'retro 120 rpm 5'fwd/5'retro 120 rpm 5'fwd/5'retro   Supine scap punches   2# 3" x20 2# 3" x20  3# 3" x20    Consider adding TB exercises NV  TB IR/ER NV Red TB x20 ea Red TB x20 ea   Sidelying sleeper stretch   10"x10 10"x10 10"x10   Towel IR stretch    10"x10 10"x10  10"x10                   Modalities        MHP to R shoulder prior to manuals 10 mins pre-tx    DC NV 10 min pre-tx  DC

## 2021-08-17 ENCOUNTER — OFFICE VISIT (OUTPATIENT)
Dept: PHYSICAL THERAPY | Facility: CLINIC | Age: 66
End: 2021-08-17
Payer: COMMERCIAL

## 2021-08-17 DIAGNOSIS — G89.29 CHRONIC RIGHT SHOULDER PAIN: Primary | ICD-10-CM

## 2021-08-17 DIAGNOSIS — M25.511 CHRONIC RIGHT SHOULDER PAIN: Primary | ICD-10-CM

## 2021-08-17 DIAGNOSIS — M75.01 ADHESIVE CAPSULITIS OF RIGHT SHOULDER: ICD-10-CM

## 2021-08-17 PROCEDURE — 97140 MANUAL THERAPY 1/> REGIONS: CPT

## 2021-08-17 PROCEDURE — 97110 THERAPEUTIC EXERCISES: CPT

## 2021-08-17 NOTE — PROGRESS NOTES
Daily Note     Today's date: 2021  Patient name: Pedro Sharma  :   MRN: [id number]  Referring provider: Fabian Wills MD  Dx:   Encounter Diagnosis     ICD-10-CM    1  Chronic right shoulder pain  M25 511     G89 29    2  Adhesive capsulitis of right shoulder  M75 01                   Subjective: Pt reported that her soulder feels the same as LV  She is happy with the motion she has gained since the start of PT  She would like to increase her strength and endurance of the R shoulder to more easily complete her ADLs  Objective: See treatment diary below      Assessment: Increased TB resistance, as well as, initiated multiple therapeutic activities to facilitate RUE strength  Tolerated treatment well  Patient demonstrated fatigue post treatment, exhibited good technique with therapeutic exercises and would benefit from continued PT  Plan: Continue per plan of care  Progress treatment as tolerated         Precautions: none  POC exp 10/5/21  Manuals 8/17 8/3 8/5 8/10 8/13   R shoulder stretching TM HOLLOWAY KG KG TM   R scapular mobs   KG KG                    Neuro Re-Ed                        Ther Ex        Pulleys 10"x10 ea flex/scap 10"x10 flex and scap 10"x10 flex and scap 10"x10 ea flex/scap 10"x10 ea flex/scap   Finger ladder  5"x10 flex/abd 5" x10 flex/abd 5" x10 ea flex/abd 5" x10 ea flex/abd   AAROM cane flexion  Supine 1# 5" x10 Supine 2# 5" x10 Supine  2# 5" x10 Supine  3# 5" x10   AAROM cane ER Standing with arm @90 deg abd against wall   5" 2x10 Standing 5" x10 Standing with arm @90 deg abd against wall  5" x10 Standing with arm @90 deg abd against wall   5" x10 Standing with arm @90 deg abd against wall   5" 2x10   AAROM cane abduction  Standing 5" x10 DC     Prone flex, ext, hrz abd 1# x10 ea       S/L ER 1# 3" 2x10       UBE 90 rpm 5'fwd/5'retro 120 rpm x5' retro 120 rpm 5'fwd/5'retro 120 rpm 5'fwd/5'retro 120 rpm 5'fwd/5'retro   Supine scap punches 3# DB 3" x20  2# 3" x20 2# 3" x20  3# 3" x20   MTP/LTP w/ TB Green TB 5" x10       IR/ER w/ TB Green TB x20  TB IR/ER NV Red TB x20 ea Red TB x20 ea   Sidelying sleeper stretch 10"x10  10"x10 10"x10 10"x10   Towel IR stretch 10"x10   10"x10 10"x10  10"x10   AROM flex to 90* 2# 2-3" x10       AROM scap to 90* 2# 2-3" x10       Modalities        MHP to R shoulder prior to manuals  10 min pre-tx  DC

## 2021-08-19 ENCOUNTER — OFFICE VISIT (OUTPATIENT)
Dept: PHYSICAL THERAPY | Facility: CLINIC | Age: 66
End: 2021-08-19
Payer: COMMERCIAL

## 2021-08-19 DIAGNOSIS — M25.511 CHRONIC RIGHT SHOULDER PAIN: Primary | ICD-10-CM

## 2021-08-19 DIAGNOSIS — G89.29 CHRONIC RIGHT SHOULDER PAIN: Primary | ICD-10-CM

## 2021-08-19 DIAGNOSIS — M75.01 ADHESIVE CAPSULITIS OF RIGHT SHOULDER: ICD-10-CM

## 2021-08-19 PROCEDURE — 97140 MANUAL THERAPY 1/> REGIONS: CPT

## 2021-08-19 PROCEDURE — 97110 THERAPEUTIC EXERCISES: CPT

## 2021-08-19 NOTE — PROGRESS NOTES
Daily Note     Today's date: 2021  Patient name: Vanessa Jackson  :   MRN: [id number]  Referring provider: Rachna Cartwright MD  Dx:   Encounter Diagnosis     ICD-10-CM    1  Chronic right shoulder pain  M25 511     G89 29    2  Adhesive capsulitis of right shoulder  M75 01                   Subjective: Pt reported that her shoulder and R bicep was sore after adding strengthening exercises to her POC  She is feeling "okay today "       Objective: See treatment diary below      Assessment: Increased reps during multiple exercises to facilitate endurance  Tolerated treatment well  Patient demonstrated fatigue post treatment, exhibited good technique with therapeutic exercises and would benefit from continued PT      Plan: Continue per plan of care  Progress treatment as tolerated         Precautions: none  POC exp 10/5/21  Manuals 8/17 8/19 8/5 8/10 8/13   R shoulder stretching TM TM KG KG TM   R scapular mobs   KG KG                    Neuro Re-Ed                        Ther Ex        Pulleys 10"x10 ea flex/scap 10"x10 flex and scap 10"x10 flex and scap 10"x10 ea flex/scap 10"x10 ea flex/scap   Finger ladder   5" x10 flex/abd 5" x10 ea flex/abd 5" x10 ea flex/abd   AAROM cane flexion   Supine 2# 5" x10 Supine  2# 5" x10 Supine  3# 5" x10   AAROM cane ER Standing with arm @90 deg abd against wall   5" 2x10 Standing with arm @90 deg abd against wall   5" 2x10 Standing with arm @90 deg abd against wall  5" x10 Standing with arm @90 deg abd against wall   5" x10 Standing with arm @90 deg abd against wall   5" 2x10   AAROM cane abduction   DC     Prone flex, ext, hrz abd 1# x10 ea 1# 2x10 ea      S/L ER 1# 3" 2x10 1# 3" 2x10      UBE 90 rpm 5'fwd/5'retro 90 rpm 5'fwd/5'retro 120 rpm 5'fwd/5'retro 120 rpm 5'fwd/5'retro 120 rpm 5'fwd/5'retro   Supine scap punches 3# DB 3" x20 3# DB 3" x20 2# 3" x20 2# 3" x20  3# 3" x20   MTP/LTP w/ TB Green TB 5" x10 Green TB 5" 2x10      IR/ER w/ TB Green TB x20 Green TB x20 TB IR/ER NV Red TB x20 ea Red TB x20 ea   Sidelying sleeper stretch   10"x10 10"x10 10"x10   Towel IR stretch    10"x10 10"x10  10"x10   AROM flex to 90* 2# 2-3" x10 2# 2-3" 2x10      AROM scap to 90* 2# 2-3" x10 2# 2-3" 2x10      Modalities        MHP to R shoulder prior to manuals    DC

## 2021-08-24 ENCOUNTER — OFFICE VISIT (OUTPATIENT)
Dept: PHYSICAL THERAPY | Facility: CLINIC | Age: 66
End: 2021-08-24
Payer: COMMERCIAL

## 2021-08-24 DIAGNOSIS — M25.511 CHRONIC RIGHT SHOULDER PAIN: Primary | ICD-10-CM

## 2021-08-24 DIAGNOSIS — G89.29 CHRONIC RIGHT SHOULDER PAIN: Primary | ICD-10-CM

## 2021-08-24 DIAGNOSIS — M75.01 ADHESIVE CAPSULITIS OF RIGHT SHOULDER: ICD-10-CM

## 2021-08-24 PROCEDURE — 97140 MANUAL THERAPY 1/> REGIONS: CPT | Performed by: PHYSICAL THERAPIST

## 2021-08-24 PROCEDURE — 97110 THERAPEUTIC EXERCISES: CPT | Performed by: PHYSICAL THERAPIST

## 2021-08-24 NOTE — PROGRESS NOTES
Daily Note     Today's date: 2021  Patient name: Flaca Vaca  : [birth year]  MRN: [id number]  Referring provider: Keira Dan MD  Dx:   Encounter Diagnosis     ICD-10-CM    1  Chronic right shoulder pain  M25 511     G89 29    2  Adhesive capsulitis of right shoulder  M75 01                   Subjective: Patient reports she feels about the same today  Objective: See treatment diary below      Assessment: Tolerated treatment well  Did not progress as patient is fatigued with current exercise program   Patient demonstrated fatigue post treatment, exhibited good technique with therapeutic exercises and would benefit from continued PT      Plan: Continue per plan of care  Progress treatment as tolerated         Precautions: none  POC exp 10/5/21  Manuals 8/17 8/19 8/24 8/10 8/13   R shoulder stretching TM TM KG KG TM   R scapular mobs   KG KG                    Neuro Re-Ed                        Ther Ex        Pulleys 10"x10 ea flex/scap 10"x10 flex and scap 10"x10 flex and scap 10"x10 ea flex/scap 10"x10 ea flex/scap   Finger ladder    5" x10 ea flex/abd 5" x10 ea flex/abd   AAROM cane flexion    Supine  2# 5" x10 Supine  3# 5" x10   AAROM cane ER Standing with arm @90 deg abd against wall   5" 2x10 Standing with arm @90 deg abd against wall   5" 2x10 Standing with arm @90 deg abd against wall  5" x10 Standing with arm @90 deg abd against wall   5" x10 Standing with arm @90 deg abd against wall   5" 2x10   Prone flex, ext, hrz abd 1# x10 ea 1# 2x10 ea 1# 2x10 ea     S/L ER 1# 3" 2x10 1# 3" 2x10 1# 2x10     UBE 90 rpm 5'fwd/5'retro 90 rpm 5'fwd/5'retro 90 rpm 5'fwd/5'retro 120 rpm 5'fwd/5'retro 120 rpm 5'fwd/5'retro   Supine scap punches 3# DB 3" x20 3# DB 3" x20 3# 3" x20 2# 3" x20  3# 3" x20   MTP/LTP w/ TB Green TB 5" x10 Green TB 5" 2x10 Green TB 5" x20     IR/ER w/ TB Green TB x20 Green TB x20 Green TB  x20 ea Red TB x20 ea Red TB x20 ea   Sidelying sleeper stretch    10"x10 10"x10   Towel IR stretch    10"x10  10"x10   AROM flex to 90* 2# 2-3" x10 2# 2-3" 2x10 2# 2-3" 2x10     AROM scap to 90* 2# 2-3" x10 2# 2-3" 2x10 2# 2-3" 2x10     Modalities        MHP to R shoulder prior to manuals    DC

## 2021-08-27 ENCOUNTER — OFFICE VISIT (OUTPATIENT)
Dept: PHYSICAL THERAPY | Facility: CLINIC | Age: 66
End: 2021-08-27
Payer: COMMERCIAL

## 2021-08-27 DIAGNOSIS — G89.29 CHRONIC RIGHT SHOULDER PAIN: Primary | ICD-10-CM

## 2021-08-27 DIAGNOSIS — M25.511 CHRONIC RIGHT SHOULDER PAIN: Primary | ICD-10-CM

## 2021-08-27 DIAGNOSIS — M75.01 ADHESIVE CAPSULITIS OF RIGHT SHOULDER: ICD-10-CM

## 2021-08-27 PROCEDURE — 97140 MANUAL THERAPY 1/> REGIONS: CPT

## 2021-08-27 PROCEDURE — 97110 THERAPEUTIC EXERCISES: CPT

## 2021-08-27 NOTE — PROGRESS NOTES
Daily Note     Today's date: 2021  Patient name: Leonel Tejada  :   MRN: [id number]  Referring provider: Zenobia Crigler, MD  Dx:   Encounter Diagnosis     ICD-10-CM    1  Chronic right shoulder pain  M25 511     G89 29    2  Adhesive capsulitis of right shoulder  M75 01                   Subjective: Pt offered no new comments about her R shoulder pain  She does not report pain prior to PT  Objective: See treatment diary below      Assessment: Increased weight and resistance during multiple exercises to facilitate strength  Tolerated treatment well  Patient demonstrated fatigue post treatment, exhibited good technique with therapeutic exercises and would benefit from continued PT  Plan: Continue per plan of care  Progress treatment as tolerated         Precautions: none  POC exp 10/5/21  Manuals    R shoulder stretching TM TM KG TM TM   R scapular mobs   KG                     Neuro Re-Ed                        Ther Ex        Pulleys 10"x10 ea flex/scap 10"x10 flex and scap 10"x10 flex and scap 10"x10 ea flex/scap 10"x10 ea flex/scap   Finger ladder     5" x10 ea flex/abd   AAROM cane flexion     Supine  3# 5" x10   AAROM cane ER Standing with arm @90 deg abd against wall   5" 2x10 Standing with arm @90 deg abd against wall   5" 2x10 Standing with arm @90 deg abd against wall  5" x10  Standing with arm @90 deg abd against wall   5" 2x10   Prone flex, ext, hrz abd 1# x10 ea 1# 2x10 ea 1# 2x10 ea 2# 2x10 ea    S/L ER 1# 3" 2x10 1# 3" 2x10 1# 2x10 2# 3" 2x10 ea    UBE 90 rpm 5'fwd/5'retro 90 rpm 5'fwd/5'retro 90 rpm 5'fwd/5'retro 90 rpm 5'fwd/5'retro 120 rpm 5'fwd/5'retro   Supine scap punches 3# DB 3" x20 3# DB 3" x20 3# 3" x20   3# DB 3" x20  3# 3" x20   MTP/LTP w/ TB Green TB 5" x10 Green TB 5" 2x10 Green TB 5" x20 Blue TB 5" 2x10    IR/ER w/ TB Green TB x20 Green TB x20 Green TB  x20 ea Blue TB x20 ea Red TB x20 ea   Sidelying sleeper stretch     10"x10   Towel IR stretch      10"x10   AROM flex to 90* 2# 2-3" x10 2# 2-3" 2x10 2# 2-3" 2x10 3# 2-3" 2x10    AROM scap to 90* 2# 2-3" x10 2# 2-3" 2x10 2# 2-3" 2x10 3# 2-3" 2x10    Wall push ups     2x5                    Modalities        MHP to R shoulder prior to manuals

## 2021-08-31 ENCOUNTER — OFFICE VISIT (OUTPATIENT)
Dept: PHYSICAL THERAPY | Facility: CLINIC | Age: 66
End: 2021-08-31
Payer: COMMERCIAL

## 2021-08-31 DIAGNOSIS — M75.01 ADHESIVE CAPSULITIS OF RIGHT SHOULDER: ICD-10-CM

## 2021-08-31 DIAGNOSIS — G89.29 CHRONIC RIGHT SHOULDER PAIN: Primary | ICD-10-CM

## 2021-08-31 DIAGNOSIS — M25.511 CHRONIC RIGHT SHOULDER PAIN: Primary | ICD-10-CM

## 2021-08-31 PROCEDURE — 97110 THERAPEUTIC EXERCISES: CPT

## 2021-08-31 PROCEDURE — 97140 MANUAL THERAPY 1/> REGIONS: CPT

## 2021-08-31 NOTE — PROGRESS NOTES
Daily Note     Today's date: 2021  Patient name: Justine King  :   MRN: [id number]  Referring provider: Genevieve Washington MD  Dx:   Encounter Diagnosis     ICD-10-CM    1  Chronic right shoulder pain  M25 511     G89 29    2  Adhesive capsulitis of right shoulder  M75 01                   Subjective: Pt reported that her "shoulder feels the same "       Objective: See treatment diary below      Assessment: Tolerated treatment well  Patient demonstrated fatigue post treatment, exhibited good technique with therapeutic exercises and would benefit from continued PT      Plan: Continue per plan of care  Progress treatment as tolerated         Precautions: none  POC exp 10/5/21  Manuals    R shoulder stretching TM TM KG TM TM   R scapular mobs   KG                     Neuro Re-Ed                        Ther Ex        Pulleys 10"x10 ea flex/scap 10"x10 flex and scap 10"x10 flex and scap 10"x10 ea flex/scap 10"x10 ea flex/scap   Finger ladder        AAROM cane flexion        AAROM cane ER Standing with arm @90 deg abd against wall   5" 2x10 Standing with arm @90 deg abd against wall   5" 2x10 Standing with arm @90 deg abd against wall  5" x10     Prone flex, ext, hrz abd 1# x10 ea 1# 2x10 ea 1# 2x10 ea 2# 2x10 ea 2# 2x10 ea   S/L ER 1# 3" 2x10 1# 3" 2x10 1# 2x10 2# 3" 2x10 ea 2# 3" 2x10 ea   UBE 90 rpm 5'fwd/5'retro 90 rpm 5'fwd/5'retro 90 rpm 5'fwd/5'retro 90 rpm 5'fwd/5'retro 90 rpm 5'fwd/5'retro   Supine scap punches 3# DB 3" x20 3# DB 3" x20 3# 3" x20   3# DB 3" x20  3# DB 3" x20   MTP/LTP w/ TB Green TB 5" x10 Green TB 5" 2x10 Green TB 5" x20 Blue TB 5" 2x10 Blue TB 5" 2x10   IR/ER w/ TB Green TB x20 Green TB x20 Green TB  x20 ea Blue TB x20 ea Blue TB x20 ea   Sidelying sleeper stretch        Towel IR stretch         AROM flex to 90* 2# 2-3" x10 2# 2-3" 2x10 2# 2-3" 2x10 3# 2-3" 2x10 3# 2-3" 2x10   AROM scap to 90* 2# 2-3" x10 2# 2-3" 2x10 2# 2-3" 2x10 3# 2-3" 2x10 3# 2-3" 2x10   Wall push ups     2x5  2x5                   Modalities        MHP to R shoulder prior to manuals

## 2021-09-02 ENCOUNTER — OFFICE VISIT (OUTPATIENT)
Dept: PHYSICAL THERAPY | Facility: CLINIC | Age: 66
End: 2021-09-02
Payer: COMMERCIAL

## 2021-09-02 DIAGNOSIS — M75.01 ADHESIVE CAPSULITIS OF RIGHT SHOULDER: ICD-10-CM

## 2021-09-02 DIAGNOSIS — M25.511 CHRONIC RIGHT SHOULDER PAIN: Primary | ICD-10-CM

## 2021-09-02 DIAGNOSIS — G89.29 CHRONIC RIGHT SHOULDER PAIN: Primary | ICD-10-CM

## 2021-09-02 PROCEDURE — 97140 MANUAL THERAPY 1/> REGIONS: CPT | Performed by: PHYSICAL THERAPIST

## 2021-09-02 PROCEDURE — 97110 THERAPEUTIC EXERCISES: CPT | Performed by: PHYSICAL THERAPIST

## 2021-09-02 NOTE — PROGRESS NOTES
Daily Note     Today's date: 2021  Patient name: Milena Erickson  :   MRN: [id number]  Referring provider: Crystal Gibson MD  Dx:   Encounter Diagnosis     ICD-10-CM    1  Chronic right shoulder pain  M25 511     G89 29    2  Adhesive capsulitis of right shoulder  M75 01                   Subjective: Patient offers no new complaints  Objective: See treatment diary below      Assessment: Tolerated treatment well  Added body blade exercise to improve shoulder stability  Slight limitations into shoulder flexion, abduction and IR ROM persist  Patient demonstrated fatigue post treatment, exhibited good technique with therapeutic exercises and would benefit from continued PT      Plan: Continue per plan of care  Progress treatment as tolerated         Precautions: none  POC exp 10/5/21  Manuals    R shoulder stretching KG TM KG TM TM   R scapular mobs   KG                     Neuro Re-Ed                        Ther Ex        Pulleys 10"x10 ea flex/scap 10"x10 flex and scap 10"x10 flex and scap 10"x10 ea flex/scap 10"x10 ea flex/scap   AAROM cane ER  Standing with arm @90 deg abd against wall   5" 2x10 Standing with arm @90 deg abd against wall  5" x10     Prone flex, ext, hrz abd 3# 2 x10 ea 1# 2x10 ea 1# 2x10 ea 2# 2x10 ea 2# 2x10 ea   S/L ER 1# 3" 2x10 1# 3" 2x10 1# 2x10 2# 3" 2x10 ea 2# 3" 2x10 ea   UBE 90 rpm 5'fwd/5'retro 90 rpm 5'fwd/5'retro 90 rpm 5'fwd/5'retro 90 rpm 5'fwd/5'retro 90 rpm 5'fwd/5'retro   Supine scap punches 3# DB 3" x20 3# DB 3" x20 3# 3" x20   3# DB 3" x20  3# DB 3" x20   MTP/LTP w/ TB BlueTB 5" x10 Green TB 5" 2x10 Green TB 5" x20 Blue TB 5" 2x10 Blue TB 5" 2x10   IR/ER w/ TB BLue TB x20 Green TB x20 Green TB  x20 ea Blue TB x20 ea Blue TB x20 ea   AROM flex to 90* 3# 2-3" x10 2# 2-3" 2x10 2# 2-3" 2x10 3# 2-3" 2x10 3# 2-3" 2x10   AROM scap to 90* 3# 2-3" x10 2# 2-3" 2x10 2# 2-3" 2x10 3# 2-3" 2x10 3# 2-3" 2x10   Wall push ups  2x5   2x5  2x5   Body blade 3 positions (shoulder neutral)  20"x2 ea               Modalities        MHP to R shoulder prior to manuals

## 2021-09-08 ENCOUNTER — OFFICE VISIT (OUTPATIENT)
Dept: PHYSICAL THERAPY | Facility: CLINIC | Age: 66
End: 2021-09-08
Payer: COMMERCIAL

## 2021-09-08 DIAGNOSIS — M25.511 CHRONIC RIGHT SHOULDER PAIN: Primary | ICD-10-CM

## 2021-09-08 DIAGNOSIS — G89.29 CHRONIC RIGHT SHOULDER PAIN: Primary | ICD-10-CM

## 2021-09-08 DIAGNOSIS — M75.01 ADHESIVE CAPSULITIS OF RIGHT SHOULDER: ICD-10-CM

## 2021-09-08 PROCEDURE — 97110 THERAPEUTIC EXERCISES: CPT | Performed by: PHYSICAL THERAPIST

## 2021-09-08 PROCEDURE — 97140 MANUAL THERAPY 1/> REGIONS: CPT | Performed by: PHYSICAL THERAPIST

## 2021-09-08 NOTE — PROGRESS NOTES
Daily Note     Today's date: 2021  Patient name: De Thorpe  :   MRN: [id number]  Referring provider: Renee Rutherford MD  Dx:   Encounter Diagnosis     ICD-10-CM    1  Chronic right shoulder pain  M25 511     G89 29    2  Adhesive capsulitis of right shoulder  M75 01                   Subjective: Patient offers no new complaints  She reports her motion has improved but she still gets a sharp pain at times  Objective: See treatment diary below      Assessment: Tolerated treatment well  Progressed resistance on UBE today  Patient demonstrated fatigue post treatment, exhibited good technique with therapeutic exercises and would benefit from continued PT       Plan: Continue per plan of care  Progress treatment as tolerated         Precautions: none  POC exp 10/5/21  Manuals    R shoulder stretching KG KG KG TM TM   R scapular mobs   KG                     Neuro Re-Ed                        Ther Ex        Pulleys 10"x10 ea flex/scap 10"x10 flex and scap 10"x10 flex and scap 10"x10 ea flex/scap 10"x10 ea flex/scap   AAROM cane ER   Standing with arm @90 deg abd against wall  5" x10     Prone flex, ext, hrz abd 3# 2 x10 ea 3# 2x10 ea 1# 2x10 ea 2# 2x10 ea 2# 2x10 ea   S/L ER 1# 3" 2x10 3# 3" 2x10 1# 2x10 2# 3" 2x10 ea 2# 3" 2x10 ea   UBE 90 rpm 5'fwd/5'retro 80 rpm 5'fwd/5'retro 90 rpm 5'fwd/5'retro 90 rpm 5'fwd/5'retro 90 rpm 5'fwd/5'retro   Supine scap punches 3# DB 3" x20 3# DB 3" x20 3# 3" x20   3# DB 3" x20  3# DB 3" x20   MTP/LTP w/ TB BlueTB 5" x10 Blue TB 5" 2x10 Green TB 5" x20 Blue TB 5" 2x10 Blue TB 5" 2x10   IR/ER w/ TB BLue TB x20 Blue TB x20 Green TB  x20 ea Blue TB x20 ea Blue TB x20 ea   AROM flex to 90* 3# 2-3" x10 3# 2-3" 2x10 2# 2-3" 2x10 3# 2-3" 2x10 3# 2-3" 2x10   AROM scap to 90* 3# 2-3" x10 3# 2-3" 2x10 2# 2-3" 2x10 3# 2-3" 2x10 3# 2-3" 2x10   Wall push ups  2x5 2x5  2x5  2x5   Body blade 3 positions (shoulder neutral)  20"x2 ea 3 way with shoulder neutral  20"x3 ea              Modalities        MHP to R shoulder prior to manuals

## 2021-09-09 ENCOUNTER — APPOINTMENT (OUTPATIENT)
Dept: PHYSICAL THERAPY | Facility: CLINIC | Age: 66
End: 2021-09-09
Payer: COMMERCIAL

## 2021-09-10 ENCOUNTER — EVALUATION (OUTPATIENT)
Dept: PHYSICAL THERAPY | Facility: CLINIC | Age: 66
End: 2021-09-10
Payer: COMMERCIAL

## 2021-09-10 DIAGNOSIS — M75.01 ADHESIVE CAPSULITIS OF RIGHT SHOULDER: ICD-10-CM

## 2021-09-10 DIAGNOSIS — G89.29 CHRONIC RIGHT SHOULDER PAIN: Primary | ICD-10-CM

## 2021-09-10 DIAGNOSIS — M25.511 CHRONIC RIGHT SHOULDER PAIN: Primary | ICD-10-CM

## 2021-09-10 PROCEDURE — 97110 THERAPEUTIC EXERCISES: CPT | Performed by: PHYSICAL THERAPIST

## 2021-09-10 PROCEDURE — 97140 MANUAL THERAPY 1/> REGIONS: CPT | Performed by: PHYSICAL THERAPIST

## 2021-09-10 NOTE — PROGRESS NOTES
PT Discharge    Today's date: 9/10/2021  Patient name: Jody Hall  : [birth year]  MRN: [id number]  Referring provider: Jyotsna Early MD  Dx:   Encounter Diagnosis     ICD-10-CM    1  Chronic right shoulder pain  M25 511     G89 29    2  Adhesive capsulitis of right shoulder  M75 01                   Assessment  Assessment details: Jody Hall has been compliant with PT services  She has made steady progress toward goals of restoring ROM  She reports an overall improvement of 80%  CC at this time is persistent shoulder pain  Recommended patient return to doctor and may benefit from steroid injection at site of pain  She is otherwise pleased with her progress and feels ready for DC at this time  Provided patient with final HEP  Impairments: pain with function  Understanding of Dx/Px/POC: good   Prognosis: good    Goals  STGs  1) In 4 weeks patient will report reduced pain to 4/10 "at worst"- partially met  2) In 4 weeks patient will demonstrate improved R shoulder ROM 15 deg or more in all directions-MET  3) In 4 weeks patient will report improved sleep-MET    LTGs  1) In 8-12 weeks patient will be independent with extensive HEP - MET  2) In 8-12 weeks patient will demonstrate R shoulder ROM WNL all directions - MET  3) In 8-12 weeks patient will report minimal R shoulder pain, <3/10 "at worst" - partially met    Plan  Plan details: DC from PT today  Treatment plan discussed with: patient        Subjective Evaluation    History of Present Illness  Mechanism of injury: Patient is a CNA at [employer]  She started noticing R shoulder pain around the start of COVID in the spring of 2020  She had then switched her role at work to working more on recreational activities but this did not help to relieve pain  Around Oct she noticed the pain cont to worsen  She then transitioned She saw her PCP in Oct and was prescribed Meloxicam and Voltaren  She did not notice much of an improvement   Since then the pain is about the same  She is back to her usual job since Oct which requires a lot of heavy lifting  She does experience pain that radiates down her arm to her elbow  A few times she might have noticed pain into her hand  Sometimes notices a tingling feeling in her elbow  The pain wakes her up every night  The pain is constant but fluctuates in intensity  Rest has not helped  She has tried heat and this helps her fall asleep  Has not tried using ice  OTC medication does not seem to help relieve the pain  She had an x-ray but no other imaging at this time  She has a f/u scheduled with her referring MD (PCP) at the end of July  Progress Note 8/10/21:  Patient is pleased with her progress  She is feeling 75% improved since starting PT  She reports she has avoided any heavy lifting but can now reach behind her head to wash her hair and under her arm to put on deodorant  She still has difficulty reaching behind her lower back but it is improving  She can now reach the second shelf in her cupboards  Pain levels have continued to decrease  Discharge 9/10/21:  Patient reports her range of motion is much improved, which was her main goal  She still has a constant pain in the shoulder and she is not sure if it is from having bone spurs  She just does not want the pain to get worse  It does not stop her from doing anything but it can get aggravated with increased activity  Pain  At best pain ratin  At worst pain ratin  Quality: dull ache and burning  Relieving factors: change in position, support and heat  Aggravating factors: lifting    Social Support    Employment status: working  Hand dominance: right  Exercise history: walks      Diagnostic Tests  X-ray: normal  Treatments  Previous treatment: medication  Current treatment: physical therapy  Patient Goals  Patient goals for therapy: decreased pain and increased motion          Objective     Concurrent Complaints  Positive for disturbed sleep   Negative for dizziness, faints, headaches, nausea/motion sickness, tinnitus, trouble swallowing, difficulty breathing and shortness of breath    Additional Special Questions  Denies unexplained weight loss    Neurological Testing     Sensation   Cervical/Thoracic     Right   Intact: light touch    Reflexes     Right   Biceps (C5/C6): normal (2+)  Brachioradialis (C6): normal (2+)  Triceps (C7): normal (2+)    Active Range of Motion   Cervical/Thoracic Spine       Cervical    Flexion:  WFL  Extension:  WFL  Left lateral flexion:  with pain Restriction level: minimal  Right lateral flexion:  WFL  Left rotation:  with pain Restriction level: minimal  Right rotation:  WFL    Right Shoulder   Flexion: 155 degrees   Extension: 50 degrees   Abduction: 120 degrees   External rotation BTH: T4   Internal rotation BTB: T12     Passive Range of Motion     Right Shoulder   Flexion: 152 degrees   Abduction: 135 degrees   External rotation 90°: 70 degrees   Internal rotation 90°: 35 degrees     Joint Play     Right Shoulder  Hypomobile in the anterior capsule, posterior capsule and inferior capsule       Strength/Myotome Testing     Right Shoulder     Planes of Motion   Flexion: 4-   Abduction: 4-   External rotation at 0°: 4-   Internal rotation at 0°: 4-   Neuro Exam:     Headaches   Patient reports headaches: No             Precautions: none  POC exp 10/5/21  Manuals 9/2 9/8 9/10 8/27 8/31   R shoulder stretching KG KG KG TM TM   R scapular mobs                        Neuro Re-Ed                        Ther Ex        Pulleys 10"x10 ea flex/scap 10"x10 flex and scap 10"x10 flex and scap 10"x10 ea flex/scap 10"x10 ea flex/scap   AAROM cane ER        Prone flex, ext, hrz abd 3# 2 x10 ea 3# 2x10 ea 3# 2x10 ea 2# 2x10 ea 2# 2x10 ea   S/L ER 1# 3" 2x10 3# 3" 2x10 3# 2x10 2# 3" 2x10 ea 2# 3" 2x10 ea   UBE 90 rpm 5'fwd/5'retro 80 rpm 5'fwd/5'retro 80 rpm 5'fwd/5'retro 90 rpm 5'fwd/5'retro 90 rpm 5'fwd/5'retro   Supine scap punches 3# DB 3" x20 3# DB 3" x20 3# 3" x20   3# DB 3" x20  3# DB 3" x20   MTP/LTP w/ TB BlueTB 5" x10 Blue TB 5" 2x10 Blue TB 5" x20 Blue TB 5" 2x10 Blue TB 5" 2x10   IR/ER w/ TB BLue TB x20 Blue TB x20 Blue TB  x20 ea Blue TB x20 ea Blue TB x20 ea   AROM flex to 90* 3# 2-3" x10 3# 2-3" 2x10 3# 2-3" 2x10 3# 2-3" 2x10 3# 2-3" 2x10   AROM scap to 90* 3# 2-3" x10 3# 2-3" 2x10 3# 2-3" 2x10 3# 2-3" 2x10 3# 2-3" 2x10   Wall push ups  2x5 2x5  2x5 2x5  2x5   Body blade 3 positions (shoulder neutral)  20"x2 ea 3 way with shoulder neutral  20"x3 ea              Modalities        MHP to R shoulder prior to manuals           CP to R shoulder post tx x10 mins

## 2021-09-10 NOTE — LETTER
September 10, 2021    [place] [address]    Patient: Satinder Wright   YOB: 1955   Date of Visit: 9/10/2021     Encounter Diagnosis     ICD-10-CM    1  Chronic right shoulder pain  M25 511     G89 29    2  Adhesive capsulitis of right shoulder  M75 01        Dear Dr Rincon Salvage: Thank you for your recent referral of Satinder Wright  Please review the attached evaluation summary from Janelle's recent visit  Please verify that you agree with the plan of care by signing the attached order  If you have any questions or concerns, please do not hesitate to call  I sincerely appreciate the opportunity to share in the care of one of your patients and hope to have another opportunity to work with you in the near future  Sincerely,    Ngozi Lundberg PT      Referring Provider:      I certify that I have read the below Plan of Care and certify the need for these services furnished under this plan of treatment while under my care  Carmen Waldron MD  [address] [address]  Via Fax: [phone]          PT Discharge    Today's date: 9/10/2021  Patient name: Satinder Wright  : [birth year]  MRN: [id number]  Referring provider: Martinez Moeller MD  Dx:   Encounter Diagnosis     ICD-10-CM    1  Chronic right shoulder pain  M25 511     G89 29    2  Adhesive capsulitis of right shoulder  M75 01                   Assessment  Assessment details: Satinder Wright has been compliant with PT services  She has made steady progress toward goals of restoring ROM  She reports an overall improvement of 80%  CC at this time is persistent shoulder pain  Recommended patient return to doctor and may benefit from steroid injection at site of pain  She is otherwise pleased with her progress and feels ready for DC at this time  Provided patient with final HEP     Impairments: pain with function  Understanding of Dx/Px/POC: good   Prognosis: good    Goals  STGs  1) In 4 weeks patient will report reduced pain to 4/10 "at worst"- partially met  2) In 4 weeks patient will demonstrate improved R shoulder ROM 15 deg or more in all directions-MET  3) In 4 weeks patient will report improved sleep-MET    LTGs  1) In 8-12 weeks patient will be independent with extensive HEP - MET  2) In 8-12 weeks patient will demonstrate R shoulder ROM WNL all directions - MET  3) In 8-12 weeks patient will report minimal R shoulder pain, <3/10 "at worst" - partially met    Plan  Plan details: DC from PT today  Treatment plan discussed with: patient        Subjective Evaluation    History of Present Illness  Mechanism of injury: Patient is a CNA at [employer]  She started noticing R shoulder pain around the start of COVID in the spring of 2020  She had then switched her role at work to working more on recreational activities but this did not help to relieve pain  Around Oct she noticed the pain cont to worsen  She then transitioned She saw her PCP in Oct and was prescribed Meloxicam and Voltaren  She did not notice much of an improvement  Since then the pain is about the same  She is back to her usual job since Oct which requires a lot of heavy lifting  She does experience pain that radiates down her arm to her elbow  A few times she might have noticed pain into her hand  Sometimes notices a tingling feeling in her elbow  The pain wakes her up every night  The pain is constant but fluctuates in intensity  Rest has not helped  She has tried heat and this helps her fall asleep  Has not tried using ice  OTC medication does not seem to help relieve the pain  She had an x-ray but no other imaging at this time  She has a f/u scheduled with her referring MD (PCP) at the end of July  Progress Note 8/10/21:  Patient is pleased with her progress  She is feeling 75% improved since starting PT   She reports she has avoided any heavy lifting but can now reach behind her head to wash her hair and under her arm to put on deodorant  She still has difficulty reaching behind her lower back but it is improving  She can now reach the second shelf in her cupboards  Pain levels have continued to decrease  Discharge 9/10/21:  Patient reports her range of motion is much improved, which was her main goal  She still has a constant pain in the shoulder and she is not sure if it is from having bone spurs  She just does not want the pain to get worse  It does not stop her from doing anything but it can get aggravated with increased activity  Pain  At best pain ratin  At worst pain ratin  Quality: dull ache and burning  Relieving factors: change in position, support and heat  Aggravating factors: lifting    Social Support    Employment status: working  Hand dominance: right  Exercise history: walks      Diagnostic Tests  X-ray: normal  Treatments  Previous treatment: medication  Current treatment: physical therapy  Patient Goals  Patient goals for therapy: decreased pain and increased motion          Objective     Concurrent Complaints  Positive for disturbed sleep   Negative for dizziness, faints, headaches, nausea/motion sickness, tinnitus, trouble swallowing, difficulty breathing and shortness of breath    Additional Special Questions  Denies unexplained weight loss    Neurological Testing     Sensation   Cervical/Thoracic     Right   Intact: light touch    Reflexes     Right   Biceps (C5/C6): normal (2+)  Brachioradialis (C6): normal (2+)  Triceps (C7): normal (2+)    Active Range of Motion   Cervical/Thoracic Spine       Cervical    Flexion:  WFL  Extension:  WFL  Left lateral flexion:  with pain Restriction level: minimal  Right lateral flexion:  WFL  Left rotation:  with pain Restriction level: minimal  Right rotation:  WFL    Right Shoulder   Flexion: 155 degrees   Extension: 50 degrees   Abduction: 120 degrees   External rotation BTH: T4   Internal rotation BTB: T12     Passive Range of Motion     Right Shoulder   Flexion: 152 degrees   Abduction: 135 degrees   External rotation 90°: 70 degrees   Internal rotation 90°: 35 degrees     Joint Play     Right Shoulder  Hypomobile in the anterior capsule, posterior capsule and inferior capsule       Strength/Myotome Testing     Right Shoulder     Planes of Motion   Flexion: 4-   Abduction: 4-   External rotation at 0°: 4-   Internal rotation at 0°: 4-   Neuro Exam:     Headaches   Patient reports headaches: No             Precautions: none  POC exp 10/5/21  Manuals 9/2 9/8 9/10 8/27 8/31   R shoulder stretching KG KG KG TM TM   R scapular mobs                        Neuro Re-Ed                        Ther Ex        Pulleys 10"x10 ea flex/scap 10"x10 flex and scap 10"x10 flex and scap 10"x10 ea flex/scap 10"x10 ea flex/scap   AAROM cane ER        Prone flex, ext, hrz abd 3# 2 x10 ea 3# 2x10 ea 3# 2x10 ea 2# 2x10 ea 2# 2x10 ea   S/L ER 1# 3" 2x10 3# 3" 2x10 3# 2x10 2# 3" 2x10 ea 2# 3" 2x10 ea   UBE 90 rpm 5'fwd/5'retro 80 rpm 5'fwd/5'retro 80 rpm 5'fwd/5'retro 90 rpm 5'fwd/5'retro 90 rpm 5'fwd/5'retro   Supine scap punches 3# DB 3" x20 3# DB 3" x20 3# 3" x20   3# DB 3" x20  3# DB 3" x20   MTP/LTP w/ TB BlueTB 5" x10 Blue TB 5" 2x10 Blue TB 5" x20 Blue TB 5" 2x10 Blue TB 5" 2x10   IR/ER w/ TB BLue TB x20 Blue TB x20 Blue TB  x20 ea Blue TB x20 ea Blue TB x20 ea   AROM flex to 90* 3# 2-3" x10 3# 2-3" 2x10 3# 2-3" 2x10 3# 2-3" 2x10 3# 2-3" 2x10   AROM scap to 90* 3# 2-3" x10 3# 2-3" 2x10 3# 2-3" 2x10 3# 2-3" 2x10 3# 2-3" 2x10   Wall push ups  2x5 2x5  2x5 2x5  2x5   Body blade 3 positions (shoulder neutral)  20"x2 ea 3 way with shoulder neutral  20"x3 ea              Modalities        MHP to R shoulder prior to manuals           CP to R shoulder post tx x10 mins

## 2022-08-12 ENCOUNTER — APPOINTMENT (OUTPATIENT)
Dept: LAB | Facility: CLINIC | Age: 67
End: 2022-08-12
Payer: COMMERCIAL

## 2022-08-12 DIAGNOSIS — I10 HYPERTENSION, UNSPECIFIED TYPE: ICD-10-CM

## 2022-08-12 DIAGNOSIS — I49.9 CARDIAC ARRHYTHMIA, UNSPECIFIED CARDIAC ARRHYTHMIA TYPE: ICD-10-CM

## 2022-08-12 DIAGNOSIS — E55.9 VITAMIN D DEFICIENCY: ICD-10-CM

## 2022-08-12 LAB
25(OH)D3 SERPL-MCNC: 37.9 NG/ML (ref 30–100)
ALBUMIN SERPL BCP-MCNC: 3.7 G/DL (ref 3.5–5)
ALP SERPL-CCNC: 100 U/L (ref 46–116)
ALT SERPL W P-5'-P-CCNC: 21 U/L (ref 12–78)
ANION GAP SERPL CALCULATED.3IONS-SCNC: 3 MMOL/L (ref 4–13)
AST SERPL W P-5'-P-CCNC: 16 U/L (ref 5–45)
BASOPHILS # BLD AUTO: 0.05 THOUSANDS/ΜL (ref 0–0.1)
BASOPHILS NFR BLD AUTO: 1 % (ref 0–1)
BILIRUB SERPL-MCNC: 0.7 MG/DL (ref 0.2–1)
BUN SERPL-MCNC: 14 MG/DL (ref 5–25)
CALCIUM SERPL-MCNC: 9.2 MG/DL (ref 8.3–10.1)
CHLORIDE SERPL-SCNC: 106 MMOL/L (ref 96–108)
CHOLEST SERPL-MCNC: 237 MG/DL
CO2 SERPL-SCNC: 31 MMOL/L (ref 21–32)
CREAT SERPL-MCNC: 0.78 MG/DL (ref 0.6–1.3)
EOSINOPHIL # BLD AUTO: 0.11 THOUSAND/ΜL (ref 0–0.61)
EOSINOPHIL NFR BLD AUTO: 2 % (ref 0–6)
ERYTHROCYTE [DISTWIDTH] IN BLOOD BY AUTOMATED COUNT: 13.1 % (ref 11.6–15.1)
GFR SERPL CREATININE-BSD FRML MDRD: 80 ML/MIN/1.73SQ M
GLUCOSE P FAST SERPL-MCNC: 99 MG/DL (ref 65–99)
HCT VFR BLD AUTO: 44.5 % (ref 34.8–46.1)
HDLC SERPL-MCNC: 60 MG/DL
HGB BLD-MCNC: 14.3 G/DL (ref 11.5–15.4)
IMM GRANULOCYTES # BLD AUTO: 0.01 THOUSAND/UL (ref 0–0.2)
IMM GRANULOCYTES NFR BLD AUTO: 0 % (ref 0–2)
LDLC SERPL CALC-MCNC: 155 MG/DL (ref 0–100)
LYMPHOCYTES # BLD AUTO: 1.43 THOUSANDS/ΜL (ref 0.6–4.47)
LYMPHOCYTES NFR BLD AUTO: 29 % (ref 14–44)
MCH RBC QN AUTO: 29.7 PG (ref 26.8–34.3)
MCHC RBC AUTO-ENTMCNC: 32.1 G/DL (ref 31.4–37.4)
MCV RBC AUTO: 92 FL (ref 82–98)
MONOCYTES # BLD AUTO: 0.37 THOUSAND/ΜL (ref 0.17–1.22)
MONOCYTES NFR BLD AUTO: 8 % (ref 4–12)
NEUTROPHILS # BLD AUTO: 2.95 THOUSANDS/ΜL (ref 1.85–7.62)
NEUTS SEG NFR BLD AUTO: 60 % (ref 43–75)
NONHDLC SERPL-MCNC: 177 MG/DL
NRBC BLD AUTO-RTO: 0 /100 WBCS
PLATELET # BLD AUTO: 263 THOUSANDS/UL (ref 149–390)
PMV BLD AUTO: 11.1 FL (ref 8.9–12.7)
POTASSIUM SERPL-SCNC: 4.6 MMOL/L (ref 3.5–5.3)
PROT SERPL-MCNC: 7.4 G/DL (ref 6.4–8.4)
RBC # BLD AUTO: 4.82 MILLION/UL (ref 3.81–5.12)
SODIUM SERPL-SCNC: 140 MMOL/L (ref 135–147)
T4 FREE SERPL-MCNC: 0.9 NG/DL (ref 0.76–1.46)
TRIGL SERPL-MCNC: 111 MG/DL
TSH SERPL DL<=0.05 MIU/L-ACNC: 1.43 UIU/ML (ref 0.45–4.5)
WBC # BLD AUTO: 4.92 THOUSAND/UL (ref 4.31–10.16)

## 2022-08-12 PROCEDURE — 85025 COMPLETE CBC W/AUTO DIFF WBC: CPT

## 2022-08-12 PROCEDURE — 80061 LIPID PANEL: CPT

## 2022-08-12 PROCEDURE — 84439 ASSAY OF FREE THYROXINE: CPT

## 2022-08-12 PROCEDURE — 36415 COLL VENOUS BLD VENIPUNCTURE: CPT

## 2022-08-12 PROCEDURE — 80053 COMPREHEN METABOLIC PANEL: CPT

## 2022-08-12 PROCEDURE — 82306 VITAMIN D 25 HYDROXY: CPT

## 2022-08-12 PROCEDURE — 84443 ASSAY THYROID STIM HORMONE: CPT

## 2022-09-13 ENCOUNTER — HOSPITAL ENCOUNTER (OUTPATIENT)
Dept: ULTRASOUND IMAGING | Facility: HOSPITAL | Age: 67
Discharge: HOME | End: 2022-09-13
Attending: FAMILY MEDICINE
Payer: COMMERCIAL

## 2022-09-13 DIAGNOSIS — R22.1 NECK FULLNESS: ICD-10-CM

## 2022-09-13 PROCEDURE — 76536 US EXAM OF HEAD AND NECK: CPT

## 2022-09-28 PROCEDURE — 88173 CYTOPATH EVAL FNA REPORT: CPT | Performed by: PATHOLOGY

## 2022-09-28 PROCEDURE — 88305 TISSUE EXAM BY PATHOLOGIST: CPT | Performed by: PATHOLOGY

## 2023-08-02 ENCOUNTER — TELEPHONE (OUTPATIENT)
Dept: ADMINISTRATIVE | Facility: OTHER | Age: 68
End: 2023-08-02

## 2023-08-02 NOTE — TELEPHONE ENCOUNTER
----- Message from Lottie Polanco MA sent at 8/1/2023 12:26 PM EDT -----  Regarding: mammo , dexa  08/01/23 12:27 PM    Hello, our patient Je Aguilar has had DEXA Scan and Mammogram completed/performed. Please assist in updating the patient chart by pulling the Care Everywhere (CE) document. The date of service is mammo 11/8/22 dexa 10/8/21.      Thank you,  [facility]

## 2023-08-29 ENCOUNTER — OFFICE VISIT (OUTPATIENT)
Dept: FAMILY MEDICINE CLINIC | Facility: CLINIC | Age: 68
End: 2023-08-29
Payer: COMMERCIAL

## 2023-08-29 VITALS
HEIGHT: 63 IN | BODY MASS INDEX: 26.05 KG/M2 | TEMPERATURE: 98.3 F | WEIGHT: 147 LBS | DIASTOLIC BLOOD PRESSURE: 78 MMHG | SYSTOLIC BLOOD PRESSURE: 128 MMHG

## 2023-08-29 DIAGNOSIS — Z00.00 ANNUAL PHYSICAL EXAM: Primary | ICD-10-CM

## 2023-08-29 DIAGNOSIS — E55.9 VITAMIN D DEFICIENCY: ICD-10-CM

## 2023-08-29 DIAGNOSIS — E78.2 MIXED HYPERLIPIDEMIA: ICD-10-CM

## 2023-08-29 DIAGNOSIS — M25.511 CHRONIC RIGHT SHOULDER PAIN: ICD-10-CM

## 2023-08-29 DIAGNOSIS — N95.9 MENOPAUSAL AND POSTMENOPAUSAL DISORDER: ICD-10-CM

## 2023-08-29 DIAGNOSIS — I10 ESSENTIAL HYPERTENSION: ICD-10-CM

## 2023-08-29 DIAGNOSIS — G89.29 CHRONIC PAIN OF LEFT THUMB: ICD-10-CM

## 2023-08-29 DIAGNOSIS — M79.645 CHRONIC PAIN OF LEFT THUMB: ICD-10-CM

## 2023-08-29 DIAGNOSIS — Z12.31 ENCOUNTER FOR SCREENING MAMMOGRAM FOR MALIGNANT NEOPLASM OF BREAST: ICD-10-CM

## 2023-08-29 DIAGNOSIS — G89.29 CHRONIC RIGHT SHOULDER PAIN: ICD-10-CM

## 2023-08-29 PROCEDURE — 99396 PREV VISIT EST AGE 40-64: CPT | Performed by: FAMILY MEDICINE

## 2023-08-29 RX ORDER — MELATONIN
1000 DAILY
COMMUNITY

## 2023-08-29 NOTE — ASSESSMENT & PLAN NOTE
Routine screening is ordered she does not get flu VAX I did recommend new COVID booster when available and she is due for an updated Adacel which she will obtain at pharmacy. Otherwise to continue same medications as well as diet and exercise program.  Recheck office visit in 1 year.

## 2023-08-29 NOTE — PROGRESS NOTES
[facility] PRIMARY [facility]ARE    NAME: Flavia Wilson  AGE: 79 y.o. SEX: female  : 1955     DATE: 2023     Assessment and Plan:     Problem List Items Addressed This Visit        Cardiovascular and Mediastinum    Essential hypertension     Very well controlled. We will continue same medications and monitoring. Relevant Orders    CBC and differential    Comprehensive metabolic panel    TSH, 3rd generation with Free T4 reflex       Other    Annual physical exam - Primary     Routine screening is ordered she does not get flu VAX I did recommend new COVID booster when available and she is due for an updated Adacel which she will obtain at pharmacy. Otherwise to continue same medications as well as diet and exercise program.  Recheck office visit in 1 year. Chronic pain of left thumb     As per Ortho         Chronic right shoulder pain     Continue her daily exercises. Other Visit Diagnoses     Encounter for screening mammogram for malignant neoplasm of breast        Relevant Orders    Mammo screening bilateral w 3d & cad    Menopausal and postmenopausal disorder        Relevant Orders    DXA bone density spine hip and pelvis    Mixed hyperlipidemia        Relevant Orders    Lipid panel    Vitamin D deficiency        Relevant Orders    Vitamin D 25 hydroxy          Immunizations and preventive care screenings were discussed with patient today. Appropriate education was printed on patient's after visit summary. Counseling:  Alcohol/drug use: discussed moderation in alcohol intake, the recommendations for healthy alcohol use, and avoidance of illicit drug use. BMI Counseling: Body mass index is 26.04 kg/m². The BMI is above normal. Nutrition recommendations include decreasing portion sizes, limiting drinks that contain sugar and reducing intake of cholesterol.  Exercise recommendations include moderate physical activity 150 minutes/week. No pharmacotherapy was ordered. Rationale for BMI follow-up plan is due to patient being overweight or obese. Depression Screening and Follow-up Plan: Patient was screened for depression during today's encounter. They screened negative with a PHQ-2 score of 1. Return in about 1 year (around 8/29/2024). Chief Complaint:     Chief Complaint   Patient presents with   • Follow-up     Routine      History of Present Illness:     Adult Annual Physical   Patient here for a comprehensive physical exam. The patient reports no problems. Patient monitors her blood pressure closely averaging 120-130/70-80. Occasionally will have a high reading usually related to not sleeping well. She denies headaches, chest pain, or shortness of breath. He stays active walking and started to learn pickleball. She has been seeing Dr. Amanda Hess for arthritis of her left thumb. She had 2 injections with minimal relief she does wear a brace with some relief. Neck step is surgery. She also still has problems with her right shoulder with a diagnosis of her frozen shoulder improved with physical therapy with increased range of motion however still with occasional achiness. Tries to be really just doing her range of motion exercises. Occasion will take an Advil, Aleve, or Tylenol for discomfort. Of note is the fact she is right-handed. She follows routinely with Dr. Mirella Menjivar for GYN exams without any problems. She did have colonoscopy 2 or 3 years ago by Dr. Porfirio Martin without problems although does have a history of colon polyp years ago. Also of note is the fact of 59-year-old son was just diagnosed with grade 4: colon CA and she has a daughter with Crohn's    Otherwise she is feeling well. She is up-to-date with all routine immunizations  Diet and Physical Activity  Diet/Nutrition: well balanced diet. Exercise: walking.       Depression Screening  PHQ-2/9 Depression Screening    Little interest or pleasure in doing things: 0 - not at all  Feeling down, depressed, or hopeless: 1 - several days  PHQ-2 Score: 1  PHQ-2 Interpretation: Negative depression screen       General Health  Sleep: sleeps well. Hearing: normal - bilateral.  Vision: no vision problems. Dental: regular dental visits. /GYN Health  Patient is: postmenopausal  Last menstrual period: 16 years  Contraceptive method: none. Review of Systems:     Review of Systems   Constitutional: Negative for chills and fever. HENT: Negative for ear pain and sore throat. Eyes: Negative for pain and visual disturbance. Respiratory: Negative for cough and shortness of breath. Cardiovascular: Negative for chest pain and palpitations. Gastrointestinal: Negative for abdominal pain, blood in stool and vomiting. Genitourinary: Negative for dysuria and hematuria. Musculoskeletal: Positive for arthralgias and joint swelling. Negative for back pain and gait problem. Skin: Negative for color change and rash. Neurological: Negative for seizures and syncope. All other systems reviewed and are negative. Past Medical History:     Past Medical History:   Diagnosis Date   • Anxiety    • Arthritis 6/2020   • Hypertension       Past Surgical History:     Past Surgical History:   Procedure Laterality Date   • AUGMENTATION BREAST     • TONSILLECTOMY     • TUBAL LIGATION     • TUMOR REMOVAL  1980's    benign from parotid gland      Social History:     Social History     Socioeconomic History   • Marital status:       Spouse name: None   • Number of children: None   • Years of education: None   • Highest education level: None   Occupational History   • None   Tobacco Use   • Smoking status: Never   • Smokeless tobacco: Never   Vaping Use   • Vaping Use: Never used   Substance and Sexual Activity   • Alcohol use: Not Currently     Comment: Rarely   • Drug use: Never   • Sexual activity: Yes     Partners: Male     Birth control/protection: None   Other Topics Concern   • None   Social History Narrative   • None     Social Determinants of Health     Financial Resource Strain: Not on file   Food Insecurity: Not on file   Transportation Needs: Not on file   Physical Activity: Not on file   Stress: Not on file   Social Connections: Not on file   Intimate Partner Violence: Not on file   Housing Stability: Not on file      Family History:     Family History   Problem Relation Age of Onset   • Psychiatric Illness Mother    • Cancer Son       Current Medications:     Current Outpatient Medications   Medication Sig Dispense Refill   • cholecalciferol (VITAMIN D3) 1,000 units tablet Take 1,000 Units by mouth daily     • lisinopril (ZESTRIL) 2.5 mg tablet Take 2.5 mg by mouth daily     • UNKNOWN TO PATIENT  (Patient not taking: Reported on 8/29/2023)       No current facility-administered medications for this visit. Allergies:     No Known Allergies   Physical Exam:     /78 (BP Location: Left arm, Patient Position: Sitting, Cuff Size: Adult)   Temp 98.3 °F (36.8 °C) (Tympanic)   Ht 5' 3" (1.6 m)   Wt 66.7 kg (147 lb)   BMI 26.04 kg/m²     Physical Exam  Vitals and nursing note reviewed. Constitutional:       General: She is not in acute distress. Appearance: She is well-developed. HENT:      Head: Normocephalic and atraumatic. Eyes:      Conjunctiva/sclera: Conjunctivae normal.   Cardiovascular:      Rate and Rhythm: Normal rate and regular rhythm. Pulses: Normal pulses. Heart sounds: Normal heart sounds. No murmur heard. Pulmonary:      Effort: Pulmonary effort is normal. No respiratory distress. Breath sounds: Normal breath sounds. Abdominal:      Palpations: Abdomen is soft. There is no mass. Tenderness: There is no abdominal tenderness. Musculoskeletal:         General: No swelling. Arms:       Cervical back: Neck supple.       Comments: Left base of thumb with slight swelling and tenderness to palpation with decreased range of motion right shoulder with slight decreased range of motion however without swelling or tenderness. All other joints normal.   Skin:     General: Skin is warm and dry. Capillary Refill: Capillary refill takes less than 2 seconds. Neurological:      Mental Status: She is alert.    Psychiatric:         Mood and Affect: Mood normal.          Ramirez Chamberlain MD  [address] [place] [address] none

## 2023-09-15 ENCOUNTER — TELEPHONE (OUTPATIENT)
Dept: ADMINISTRATIVE | Facility: OTHER | Age: 68
End: 2023-09-15

## 2023-09-15 NOTE — TELEPHONE ENCOUNTER
Upon review of the In Basket request we were able to locate, review, and update the patient chart as requested for CRC: Colonoscopy. Any additional questions or concerns should be emailed to the Practice Liaisons via the appropriate education email address, please do not reply via In Basket.     Thank you  Tata Adorno MA

## 2023-09-15 NOTE — TELEPHONE ENCOUNTER
----- Message from Ryley Rodriguez MA sent at 9/15/2023 11:04 AM EDT -----  Regarding: colonoscopy  09/15/23 11:04 AM    Hello, our patient Deion Spivey has had CRC: Colonoscopy completed/performed. Please assist in updating the patient chart by pulling the document from the Media Tab. The date of service is 12/04/2020.      Thank you,  [facility]

## 2024-01-20 DIAGNOSIS — I10 ESSENTIAL HYPERTENSION: Primary | ICD-10-CM

## 2024-01-22 RX ORDER — LISINOPRIL 2.5 MG/1
2.5 TABLET ORAL DAILY
Qty: 90 TABLET | Refills: 3 | Status: SHIPPED | OUTPATIENT
Start: 2024-01-22

## 2024-08-29 ENCOUNTER — APPOINTMENT (OUTPATIENT)
Age: 69
End: 2024-08-29
Payer: MEDICARE

## 2024-08-29 ENCOUNTER — OFFICE VISIT (OUTPATIENT)
Dept: FAMILY MEDICINE CLINIC | Facility: CLINIC | Age: 69
End: 2024-08-29
Payer: MEDICARE

## 2024-08-29 VITALS
DIASTOLIC BLOOD PRESSURE: 80 MMHG | WEIGHT: 152 LBS | OXYGEN SATURATION: 95 % | HEART RATE: 88 BPM | HEIGHT: 65 IN | BODY MASS INDEX: 25.33 KG/M2 | SYSTOLIC BLOOD PRESSURE: 136 MMHG

## 2024-08-29 DIAGNOSIS — E55.9 VITAMIN D DEFICIENCY: ICD-10-CM

## 2024-08-29 DIAGNOSIS — F32.1 CURRENT MODERATE EPISODE OF MAJOR DEPRESSIVE DISORDER WITHOUT PRIOR EPISODE (HCC): ICD-10-CM

## 2024-08-29 DIAGNOSIS — M85.80 OSTEOPENIA, UNSPECIFIED LOCATION: ICD-10-CM

## 2024-08-29 DIAGNOSIS — I10 ESSENTIAL HYPERTENSION: ICD-10-CM

## 2024-08-29 DIAGNOSIS — Z00.00 INITIAL MEDICARE ANNUAL WELLNESS VISIT: Primary | ICD-10-CM

## 2024-08-29 PROBLEM — M85.89 OSTEOPENIA OF MULTIPLE SITES: Status: ACTIVE | Noted: 2024-08-29

## 2024-08-29 LAB
25(OH)D3 SERPL-MCNC: 37.3 NG/ML (ref 30–100)
ALBUMIN SERPL BCG-MCNC: 4.6 G/DL (ref 3.5–5)
ALP SERPL-CCNC: 91 U/L (ref 34–104)
ALT SERPL W P-5'-P-CCNC: 14 U/L (ref 7–52)
ANION GAP SERPL CALCULATED.3IONS-SCNC: 7 MMOL/L (ref 4–13)
AST SERPL W P-5'-P-CCNC: 16 U/L (ref 13–39)
BASOPHILS # BLD AUTO: 0.07 THOUSANDS/ÂΜL (ref 0–0.1)
BASOPHILS NFR BLD AUTO: 1 % (ref 0–1)
BILIRUB SERPL-MCNC: 0.6 MG/DL (ref 0.2–1)
BUN SERPL-MCNC: 17 MG/DL (ref 5–25)
CALCIUM SERPL-MCNC: 9.6 MG/DL (ref 8.4–10.2)
CHLORIDE SERPL-SCNC: 103 MMOL/L (ref 96–108)
CHOLEST SERPL-MCNC: 260 MG/DL
CO2 SERPL-SCNC: 29 MMOL/L (ref 21–32)
CREAT SERPL-MCNC: 0.69 MG/DL (ref 0.6–1.3)
EOSINOPHIL # BLD AUTO: 0.05 THOUSAND/ÂΜL (ref 0–0.61)
EOSINOPHIL NFR BLD AUTO: 1 % (ref 0–6)
ERYTHROCYTE [DISTWIDTH] IN BLOOD BY AUTOMATED COUNT: 13.2 % (ref 11.6–15.1)
GFR SERPL CREATININE-BSD FRML MDRD: 90 ML/MIN/1.73SQ M
GLUCOSE P FAST SERPL-MCNC: 90 MG/DL (ref 65–99)
HCT VFR BLD AUTO: 43.8 % (ref 34.8–46.1)
HDLC SERPL-MCNC: 54 MG/DL
HGB BLD-MCNC: 14 G/DL (ref 11.5–15.4)
IMM GRANULOCYTES # BLD AUTO: 0.02 THOUSAND/UL (ref 0–0.2)
IMM GRANULOCYTES NFR BLD AUTO: 0 % (ref 0–2)
LDLC SERPL CALC-MCNC: 166 MG/DL (ref 0–100)
LYMPHOCYTES # BLD AUTO: 1.37 THOUSANDS/ÂΜL (ref 0.6–4.47)
LYMPHOCYTES NFR BLD AUTO: 26 % (ref 14–44)
MCH RBC QN AUTO: 28.9 PG (ref 26.8–34.3)
MCHC RBC AUTO-ENTMCNC: 32 G/DL (ref 31.4–37.4)
MCV RBC AUTO: 91 FL (ref 82–98)
MONOCYTES # BLD AUTO: 0.37 THOUSAND/ÂΜL (ref 0.17–1.22)
MONOCYTES NFR BLD AUTO: 7 % (ref 4–12)
NEUTROPHILS # BLD AUTO: 3.39 THOUSANDS/ÂΜL (ref 1.85–7.62)
NEUTS SEG NFR BLD AUTO: 65 % (ref 43–75)
NONHDLC SERPL-MCNC: 206 MG/DL
NRBC BLD AUTO-RTO: 0 /100 WBCS
PLATELET # BLD AUTO: 271 THOUSANDS/UL (ref 149–390)
PMV BLD AUTO: 11.5 FL (ref 8.9–12.7)
POTASSIUM SERPL-SCNC: 4.3 MMOL/L (ref 3.5–5.3)
PROT SERPL-MCNC: 7.1 G/DL (ref 6.4–8.4)
RBC # BLD AUTO: 4.84 MILLION/UL (ref 3.81–5.12)
SODIUM SERPL-SCNC: 139 MMOL/L (ref 135–147)
TRIGL SERPL-MCNC: 199 MG/DL
TSH SERPL DL<=0.05 MIU/L-ACNC: 2.58 UIU/ML (ref 0.45–4.5)
WBC # BLD AUTO: 5.27 THOUSAND/UL (ref 4.31–10.16)

## 2024-08-29 PROCEDURE — G0438 PPPS, INITIAL VISIT: HCPCS | Performed by: FAMILY MEDICINE

## 2024-08-29 PROCEDURE — 82306 VITAMIN D 25 HYDROXY: CPT

## 2024-08-29 PROCEDURE — 84443 ASSAY THYROID STIM HORMONE: CPT

## 2024-08-29 PROCEDURE — 36415 COLL VENOUS BLD VENIPUNCTURE: CPT

## 2024-08-29 PROCEDURE — 80061 LIPID PANEL: CPT

## 2024-08-29 PROCEDURE — 80053 COMPREHEN METABOLIC PANEL: CPT

## 2024-08-29 PROCEDURE — 85025 COMPLETE CBC W/AUTO DIFF WBC: CPT

## 2024-08-29 RX ORDER — ANTIOX #8/OM3/DHA/EPA/LUT/ZEAX 250-2.5 MG
2 CAPSULE ORAL 2 TIMES DAILY
COMMUNITY

## 2024-08-29 NOTE — PATIENT INSTRUCTIONS
Medicare Preventive Visit Patient Instructions  Thank you for completing your Welcome to Medicare Visit or Medicare Annual Wellness Visit today. Your next wellness visit will be due in one year (8/30/2025).  The screening/preventive services that you may require over the next 5-10 years are detailed below. Some tests may not apply to you based off risk factors and/or age. Screening tests ordered at today's visit but not completed yet may show as past due. Also, please note that scanned in results may not display below.  Preventive Screenings:  Service Recommendations Previous Testing/Comments   Colorectal Cancer Screening  * Colonoscopy    * Fecal Occult Blood Test (FOBT)/Fecal Immunochemical Test (FIT)  * Fecal DNA/Cologuard Test  * Flexible Sigmoidoscopy Age: 45-75 years old   Colonoscopy: every 10 years (may be performed more frequently if at higher risk)  OR  FOBT/FIT: every 1 year  OR  Cologuard: every 3 years  OR  Sigmoidoscopy: every 5 years  Screening may be recommended earlier than age 45 if at higher risk for colorectal cancer. Also, an individualized decision between you and your healthcare provider will decide whether screening between the ages of 76-85 would be appropriate. Colonoscopy: 12/04/2020  FOBT/FIT: Not on file  Cologuard: Not on file  Sigmoidoscopy: Not on file    Screening Current     Breast Cancer Screening Age: 40+ years old  Frequency: every 1-2 years  Not required if history of left and right mastectomy Mammogram: 03/27/2024    Screening Current   Cervical Cancer Screening Between the ages of 21-29, pap smear recommended once every 3 years.   Between the ages of 30-65, can perform pap smear with HPV co-testing every 5 years.   Recommendations may differ for women with a history of total hysterectomy, cervical cancer, or abnormal pap smears in past. Pap Smear: Not on file    Screening Not Indicated   Hepatitis C Screening Once for adults born between 1945 and 1965  More frequently in  patients at high risk for Hepatitis C Hep C Antibody: 09/15/2017    Screening Current   Diabetes Screening 1-2 times per year if you're at risk for diabetes or have pre-diabetes Fasting glucose: 99 mg/dL (8/12/2022)  A1C: No results in last 5 years (No results in last 5 years)  Screening Current   Cholesterol Screening Once every 5 years if you don't have a lipid disorder. May order more often based on risk factors. Lipid panel: 09/08/2023    Screening Current     Other Preventive Screenings Covered by Medicare:  Abdominal Aortic Aneurysm (AAA) Screening: covered once if your at risk. You're considered to be at risk if you have a family history of AAA.  Lung Cancer Screening: covers low dose CT scan once per year if you meet all of the following conditions: (1) Age 55-77; (2) No signs or symptoms of lung cancer; (3) Current smoker or have quit smoking within the last 15 years; (4) You have a tobacco smoking history of at least 20 pack years (packs per day multiplied by number of years you smoked); (5) You get a written order from a healthcare provider.  Glaucoma Screening: covered annually if you're considered high risk: (1) You have diabetes OR (2) Family history of glaucoma OR (3)  aged 50 and older OR (4)  American aged 65 and older  Osteoporosis Screening: covered every 2 years if you meet one of the following conditions: (1) You're estrogen deficient and at risk for osteoporosis based off medical history and other findings; (2) Have a vertebral abnormality; (3) On glucocorticoid therapy for more than 3 months; (4) Have primary hyperparathyroidism; (5) On osteoporosis medications and need to assess response to drug therapy.   Last bone density test (DXA Scan): 10/28/2021.  HIV Screening: covered annually if you're between the age of 15-65. Also covered annually if you are younger than 15 and older than 65 with risk factors for HIV infection. For pregnant patients, it is covered up to 3  times per pregnancy.    Immunizations:  Immunization Recommendations   Influenza Vaccine Annual influenza vaccination during flu season is recommended for all persons aged >= 6 months who do not have contraindications   Pneumococcal Vaccine   * Pneumococcal conjugate vaccine = PCV13 (Prevnar 13), PCV15 (Vaxneuvance), PCV20 (Prevnar 20)  * Pneumococcal polysaccharide vaccine = PPSV23 (Pneumovax) Adults 19-65 yo with certain risk factors or if 65+ yo  If never received any pneumonia vaccine: recommend Prevnar 20 (PCV20)  Give PCV20 if previously received 1 dose of PCV13 or PPSV23   Hepatitis B Vaccine 3 dose series if at intermediate or high risk (ex: diabetes, end stage renal disease, liver disease)   Respiratory syncytial virus (RSV) Vaccine - COVERED BY MEDICARE PART D  * RSVPreF3 (Arexvy) CDC recommends that adults 60 years of age and older may receive a single dose of RSV vaccine using shared clinical decision-making (SCDM)   Tetanus (Td) Vaccine - COST NOT COVERED BY MEDICARE PART B Following completion of primary series, a booster dose should be given every 10 years to maintain immunity against tetanus. Td may also be given as tetanus wound prophylaxis.   Tdap Vaccine - COST NOT COVERED BY MEDICARE PART B Recommended at least once for all adults. For pregnant patients, recommended with each pregnancy.   Shingles Vaccine (Shingrix) - COST NOT COVERED BY MEDICARE PART B  2 shot series recommended in those 19 years and older who have or will have weakened immune systems or those 50 years and older     Health Maintenance Due:      Topic Date Due   • Breast Cancer Screening: Mammogram  03/27/2025   • Colorectal Cancer Screening  12/04/2025   • DXA SCAN  10/28/2026   • Hepatitis C Screening  Completed     Immunizations Due:      Topic Date Due   • Pneumococcal Vaccine: 65+ Years (1 of 1 - PCV) 05/14/2022   • DTaP,Tdap,and Td Vaccines (4 - Td or Tdap) 10/03/2023   • Influenza Vaccine (1) 09/01/2024     Advance  Directives   What are advance directives?  Advance directives are legal documents that state your wishes and plans for medical care. These plans are made ahead of time in case you lose your ability to make decisions for yourself. Advance directives can apply to any medical decision, such as the treatments you want, and if you want to donate organs.   What are the types of advance directives?  There are many types of advance directives, and each state has rules about how to use them. You may choose a combination of any of the following:  Living will:  This is a written record of the treatment you want. You can also choose which treatments you do not want, which to limit, and which to stop at a certain time. This includes surgery, medicine, IV fluid, and tube feedings.   Durable power of  for healthcare (DPAHC):  This is a written record that states who you want to make healthcare choices for you when you are unable to make them for yourself. This person, called a proxy, is usually a family member or a friend. You may choose more than 1 proxy.  Do not resuscitate (DNR) order:  A DNR order is used in case your heart stops beating or you stop breathing. It is a request not to have certain forms of treatment, such as CPR. A DNR order may be included in other types of advance directives.  Medical directive:  This covers the care that you want if you are in a coma, near death, or unable to make decisions for yourself. You can list the treatments you want for each condition. Treatment may include pain medicine, surgery, blood transfusions, dialysis, IV or tube feedings, and a ventilator (breathing machine).  Values history:  This document has questions about your views, beliefs, and how you feel and think about life. This information can help others choose the care that you would choose.  Why are advance directives important?  An advance directive helps you control your care. Although spoken wishes may be used, it  is better to have your wishes written down. Spoken wishes can be misunderstood, or not followed. Treatments may be given even if you do not want them. An advance directive may make it easier for your family to make difficult choices about your care.   Weight Management   Why it is important to manage your weight:  Being overweight increases your risk of health conditions such as heart disease, high blood pressure, type 2 diabetes, and certain types of cancer. It can also increase your risk for osteoarthritis, sleep apnea, and other respiratory problems. Aim for a slow, steady weight loss. Even a small amount of weight loss can lower your risk of health problems.  How to lose weight safely:  A safe and healthy way to lose weight is to eat fewer calories and get regular exercise. You can lose up about 1 pound a week by decreasing the number of calories you eat by 500 calories each day.   Healthy meal plan for weight management:  A healthy meal plan includes a variety of foods, contains fewer calories, and helps you stay healthy. A healthy meal plan includes the following:  Eat whole-grain foods more often.  A healthy meal plan should contain fiber. Fiber is the part of grains, fruits, and vegetables that is not broken down by your body. Whole-grain foods are healthy and provide extra fiber in your diet. Some examples of whole-grain foods are whole-wheat breads and pastas, oatmeal, brown rice, and bulgur.  Eat a variety of vegetables every day.  Include dark, leafy greens such as spinach, kale, nafisa greens, and mustard greens. Eat yellow and orange vegetables such as carrots, sweet potatoes, and winter squash.   Eat a variety of fruits every day.  Choose fresh or canned fruit (canned in its own juice or light syrup) instead of juice. Fruit juice has very little or no fiber.  Eat low-fat dairy foods.  Drink fat-free (skim) milk or 1% milk. Eat fat-free yogurt and low-fat cottage cheese. Try low-fat cheeses such as  mozzarella and other reduced-fat cheeses.  Choose meat and other protein foods that are low in fat.  Choose beans or other legumes such as split peas or lentils. Choose fish, skinless poultry (chicken or turkey), or lean cuts of red meat (beef or pork). Before you cook meat or poultry, cut off any visible fat.   Use less fat and oil.  Try baking foods instead of frying them. Add less fat, such as margarine, sour cream, regular salad dressing and mayonnaise to foods. Eat fewer high-fat foods. Some examples of high-fat foods include french fries, doughnuts, ice cream, and cakes.  Eat fewer sweets.  Limit foods and drinks that are high in sugar. This includes candy, cookies, regular soda, and sweetened drinks.  Exercise:  Exercise at least 30 minutes per day on most days of the week. Some examples of exercise include walking, biking, dancing, and swimming. You can also fit in more physical activity by taking the stairs instead of the elevator or parking farther away from stores. Ask your healthcare provider about the best exercise plan for you.      © Copyright [employer] 2018 Information is for End User's use only and may not be sold, redistributed or otherwise used for commercial purposes. All illustrations and images included in CareNotes® are the copyrighted property of [employer] or [employer]

## 2024-08-29 NOTE — PROGRESS NOTES
Ambulatory Visit  Name: Janelle Parry      : 1955      MRN: [id number]  Encounter Provider: Rina Edwards MD  Encounter Date: 2024   Encounter department: [facility]    Assessment & Plan   1. Initial Medicare annual wellness visit  Assessment & Plan:  Routine labs ordered.  She is up-to-date with all other testing.  I recommended flu VAX later this year as well as an updated Adacel.  Last COVID-vaccine was within the past year therefore I think she can hold on the new vaccine for a while.  I do not think she wants the RSV vaccine at this time.  She will follow-up in 1 year with Dr. Yang  2. Essential hypertension  Assessment & Plan:  Home monitoring very well-controlled.  Continue same medication and encourage good hydration.  If she does get more systolic readings less than 100 even without symptoms she should call me.   Of interest also the lisinopril seems to be controlling her prior symptoms of palpitations.  Orders:  -     Lipid panel; Future  -     TSH, 3rd generation with Free T4 reflex; Future  -     CBC and differential; Future  -     Comprehensive metabolic panel; Future  3. Osteopenia, unspecified location  Assessment & Plan:  Stable per recent DEXA scan therefore to continue same dose of vitamin D and as much walking and weightbearing exercise as possible.  Will repeat DEXA scan in 2 years  Orders:  -     Vitamin D 25 hydroxy; Future  4. Current moderate episode of major depressive disorder without prior episode (HCC)  Assessment & Plan:  Depression exacerbated due to bereavement and the death of her son.  She is seeing a therapist on a regular basis which she feels is helping not wishing any medication at this time.  She also is sleeping well as noted she knows to call with any worsening symptoms.  I did encourage her to continue with her walking and exercise program as well as spending value time with her father.  He tries to make trips to [place] and at  least twice a year.          Preventive health issues were discussed with patient, and age appropriate screening tests were ordered as noted in patient's After Visit Summary. Personalized health advice and appropriate referrals for health education or preventive services given if needed, as noted in patient's After Visit Summary.    History of Present Illness     Patient has had a rough year with the loss of her son.  He had been treated for stage IV colon cancer but was doing quite well; went for surgery to reverse his ostomy and unfortunately had postop complications resulting in his death.  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  She is back in therapy and doing somewhat better.  Her daughter-in-law and grandchildren live in [place] she has a daughter in [place] however plans to stay here at the present since she has an 87-year-old dad who is quite well and healthy.  She is trying to find her way feeling her days-during the summer outside a lot and walking daily as well as getting back into reading, but unsure about the future.  She is thinking about volunteering when she emotionally feels better.  She states she sleeps fairly well occasionally requiring some over-the-counter debates which do help.  Physically she feels great.  She brought daily blood pressure monitoring with her most numbers averaging 100-115/60-70; occasional systolic less than 90 but without any symptoms.  She also has noted no palpitations since she is on lisinopril.  With her daily walks she denies any chest pain or shortness of breath.  She does stay well-hydrated.  She had recent GYN exam with Pap smear all within normal limits.  Last colonoscopy was in 2020 by Dr. Baez; she is due again in 5 years.  She denies any  or GI complaints she also had recent mammogram and DEXA.  She is fasting today for labs.  Immunizations were reviewed.   She plans to obtain flu VAX in October       Patient Care Team:  Rina Edwards MD as PCP - General (Family Medicine)    Review of Systems  Medical History Reviewed by provider this encounter:  Tobacco  Allergies  Meds  Problems  Med Hx  Surg Hx  Fam Hx       Annual Wellness Visit Questionnaire   Janelle is here for her Initial Wellness visit.     Health Risk Assessment:   Patient rates overall health as very good. Patient feels that their physical health rating is same. Patient is satisfied with their life. Eyesight was rated as same. Hearing was rated as same. Patient feels that their emotional and mental health rating is slightly worse. Patients states they are never, rarely angry. Patient states they are sometimes unusually tired/fatigued. Pain experienced in the last 7 days has been none. Patient states that she has experienced weight loss or gain in last 6 months. Gained a few pounds at the time of her son's death due to a complete change of lifestyle.  Now back with her daily exercise program and tries to eat healthy    Depression Screening:   PHQ-2 Score: 2      Fall Risk Screening:   In the past year, patient has experienced: no history of falling in past year      Urinary Incontinence Screening:   Patient has not leaked urine accidently in the last six months.     Home Safety:  Patient does not have trouble with stairs inside or outside of their home. Patient has working smoke alarms and has working carbon monoxide detector. Home safety hazards include: none.     Nutrition:   Current diet is Regular.     Medications:   Patient is currently taking over-the-counter supplements. OTC medications include: see medication list. Patient is able to manage medications.     Activities of Daily Living (ADLs)/Instrumental Activities of Daily Living (IADLs):   Walk and transfer into and out of bed and chair?: Yes  Dress and groom yourself?: Yes    Bathe or shower yourself?: Yes    Feed yourself? Yes  Do your  laundry/housekeeping?: Yes  Manage your money, pay your bills and track your expenses?: Yes  Make your own meals?: Yes    Do your own shopping?: Yes    Previous Hospitalizations:   Any hospitalizations or ED visits within the last 12 months?: No      Advance Care Planning:   Living will: Yes    Durable POA for healthcare: Yes    Advanced directive: Yes    Advanced directive counseling given: Yes    ACP document given: Yes    Patient declined ACP directive: No    End of Life Decisions reviewed with patient: Yes    Provider agrees with end of life decisions: Yes      Cognitive Screening:   Provider or family/friend/caregiver concerned regarding cognition?: No    PREVENTIVE SCREENINGS      Cardiovascular Screening:    General: Screening Current    Due for: Lipid Panel      Diabetes Screening:     General: Screening Current    Due for: Blood Glucose      Colorectal Cancer Screening:     General: Screening Current      Breast Cancer Screening:     General: Screening Current      Cervical Cancer Screening:    General: Screening Not Indicated and Screening Current      Osteoporosis Screening:    General: Screening Current      Abdominal Aortic Aneurysm (AAA) Screening:        General: Screening Not Indicated      Lung Cancer Screening:     General: Screening Not Indicated      Hepatitis C Screening:    General: Screening Current      Preventive Screening Comments: Hade recent Dexa 2024    Screening, Brief Intervention, and Referral to Treatment (SBIRT)    Screening      Single Item Drug Screening:  How often have you used an illegal drug (including marijuana) or a prescription medication for non-medical reasons in the past year? never    Single Item Drug Screen Score: 0  Interpretation: Negative screen for possible drug use disorder    Other Counseling Topics:   Calcium and vitamin D intake and regular weightbearing exercise.     Social Determinants of Health     Food Insecurity: No Food Insecurity (8/29/2024)    Hunger  "Vital Sign    • Worried About Running Out of Food in the Last Year: Never true    • Ran Out of Food in the Last Year: Never true   Transportation Needs: No Transportation Needs (8/29/2024)    PRAPARE - Transportation    • Lack of Transportation (Medical): No    • Lack of Transportation (Non-Medical): No   Housing Stability: Low Risk  (8/29/2024)    Housing Stability Vital Sign    • Unable to Pay for Housing in the Last Year: No    • Number of Times Moved in the Last Year: 0    • Homeless in the Last Year: No   Utilities: Not At Risk (8/29/2024)    [facility] Utilities    • Threatened with loss of utilities: No     No results found.    Objective     /80 (BP Location: Left arm, Patient Position: Sitting, Cuff Size: Adult)   Pulse 88   Ht 5' 4.5\" (1.638 m)   Wt 68.9 kg (152 lb)   SpO2 95%   BMI 25.69 kg/m²     Physical Exam  Vitals and nursing note reviewed.   Constitutional:       General: She is not in acute distress.     Appearance: Normal appearance. She is well-developed.   HENT:      Head: Normocephalic and atraumatic.   Eyes:      Conjunctiva/sclera: Conjunctivae normal.   Neck:      Vascular: No carotid bruit.   Cardiovascular:      Rate and Rhythm: Normal rate and regular rhythm.      Pulses: Normal pulses.      Heart sounds: Normal heart sounds. No murmur heard.     Comments: Rhythm is sinus with a heart rate of 74 without any ectopics audible listening for 1 minute  Pulmonary:      Effort: Pulmonary effort is normal. No respiratory distress.      Breath sounds: Normal breath sounds.   Abdominal:      Palpations: Abdomen is soft. There is no mass.      Tenderness: There is no abdominal tenderness.   Musculoskeletal:         General: No swelling.      Cervical back: Neck supple.      Right lower leg: No edema.      Left lower leg: No edema.   Lymphadenopathy:      Cervical: No cervical adenopathy.   Skin:     General: Skin is warm and dry.      Capillary Refill: Capillary refill takes less than 2 seconds. "   Neurological:      Mental Status: She is alert.   Psychiatric:         Mood and Affect: Mood normal.         Behavior: Behavior normal.         Thought Content: Thought content normal.         Judgment: Judgment normal.

## 2024-08-29 NOTE — ASSESSMENT & PLAN NOTE
Depression exacerbated due to bereavement and the death of her son.  She is seeing a therapist on a regular basis which she feels is helping not wishing any medication at this time.  She also is sleeping well as noted she knows to call with any worsening symptoms.  I did encourage her to continue with her walking and exercise program as well as spending value time with her father.  He tries to make trips to [place] and at least twice a year.

## 2024-08-29 NOTE — ASSESSMENT & PLAN NOTE
Home monitoring very well-controlled.  Continue same medication and encourage good hydration.  If she does get more systolic readings less than 100 even without symptoms she should call me.   Of interest also the lisinopril seems to be controlling her prior symptoms of palpitations.

## 2024-08-29 NOTE — ASSESSMENT & PLAN NOTE
Routine labs ordered.  She is up-to-date with all other testing.  I recommended flu VAX later this year as well as an updated Adacel.  Last COVID-vaccine was within the past year therefore I think she can hold on the new vaccine for a while.  I do not think she wants the RSV vaccine at this time.  She will follow-up in 1 year with Dr. Yang

## 2024-08-29 NOTE — ASSESSMENT & PLAN NOTE
Stable per recent DEXA scan therefore to continue same dose of vitamin D and as much walking and weightbearing exercise as possible.  Will repeat DEXA scan in 2 years

## 2024-08-30 ENCOUNTER — TELEPHONE (OUTPATIENT)
Dept: FAMILY MEDICINE CLINIC | Facility: CLINIC | Age: 69
End: 2024-08-30

## 2024-08-30 DIAGNOSIS — E78.2 MIXED HYPERLIPIDEMIA: Primary | ICD-10-CM

## 2024-08-30 NOTE — TELEPHONE ENCOUNTER
----- Message from Rina Edwards MD sent at 8/30/2024  7:29 AM EDT -----  Cholesterol is much worse compared to last done 2 years ago I am sure a lot due to this and events and change in lifestyle.  Before beginning medication I advise a low-cholesterol diet increased walking and exercise program and will repeat this blood test in 3 to 4 months if not improved by that time will need medication.  Her other labs are all good for her to continue the same medication she is taking now  
Patient called and was advised     ----- Message from Rina Edwards MD sent at 8/30/2024  7:29 AM EDT -----  Cholesterol is much worse compared to last done 2 years ago I am sure a lot due to this and events and change in lifestyle.  Before beginning medication I advise a low-cholesterol diet increased walking and exercise program and will repeat this blood test in 3 to 4 months if not improved by that time will need medication.  Her other labs are all good for her to continue the same medication she is taking now     
lmtcb  
No

## 2024-09-28 PROBLEM — Z00.00 INITIAL MEDICARE ANNUAL WELLNESS VISIT: Status: RESOLVED | Noted: 2024-08-29 | Resolved: 2024-09-28

## 2024-12-10 DIAGNOSIS — I10 ESSENTIAL HYPERTENSION: ICD-10-CM

## 2024-12-10 NOTE — TELEPHONE ENCOUNTER
Reason for call:   [x] Refill   [] Prior Auth  [] Other:     Office:   [x] PCP/Provider - BRII OSBORN / Jerry    Medication: lisinopril    Dose/Frequency: 2.5mg qd    Quantity: 90    Pharmacy: [facility] [place] [address]     Does the patient have enough for 3 days?   [x] Yes   [] No - Send as HP to POD

## 2024-12-11 RX ORDER — LISINOPRIL 2.5 MG/1
2.5 TABLET ORAL DAILY
Qty: 90 TABLET | Refills: 1 | Status: SHIPPED | OUTPATIENT
Start: 2024-12-11

## 2025-06-27 DIAGNOSIS — I10 ESSENTIAL HYPERTENSION: ICD-10-CM

## 2025-06-27 NOTE — TELEPHONE ENCOUNTER
Reason for call:   [x] Refill   [] Prior Auth  [] Other:     Office:   [x] PCP/Provider -   [] Specialty/Provider -     Medication: lisinopril (ZESTRIL) 2.5 mg tablet    Dose/Frequency: Take 1 tablet (2.5 mg total) by mouth daily,     Quantity: : 90 tablet     Pharmacy: [facility] [place] - [address] [place] [address]   Does the patient have enough for 3 days?   [x] Yes   [] No - Send as HP to POD

## 2025-06-30 RX ORDER — LISINOPRIL 2.5 MG/1
2.5 TABLET ORAL DAILY
Qty: 90 TABLET | Refills: 0 | Status: SHIPPED | OUTPATIENT
Start: 2025-06-30